# Patient Record
Sex: FEMALE | Race: WHITE | Employment: OTHER | URBAN - METROPOLITAN AREA
[De-identification: names, ages, dates, MRNs, and addresses within clinical notes are randomized per-mention and may not be internally consistent; named-entity substitution may affect disease eponyms.]

---

## 2020-07-22 DIAGNOSIS — F41.1 GAD (GENERALIZED ANXIETY DISORDER): Primary | ICD-10-CM

## 2020-07-24 VITALS
WEIGHT: 118 LBS | OXYGEN SATURATION: 98 % | TEMPERATURE: 97.8 F | HEIGHT: 64 IN | SYSTOLIC BLOOD PRESSURE: 132 MMHG | DIASTOLIC BLOOD PRESSURE: 84 MMHG | BODY MASS INDEX: 20.14 KG/M2 | RESPIRATION RATE: 16 BRPM | HEART RATE: 88 BPM

## 2020-07-24 PROBLEM — E78.5 HYPERLIPIDEMIA: Status: ACTIVE | Noted: 2020-07-24

## 2020-07-24 PROBLEM — I10 ESSENTIAL HYPERTENSION: Status: ACTIVE | Noted: 2020-07-24

## 2020-07-24 PROBLEM — F41.1 GENERALIZED ANXIETY DISORDER: Status: ACTIVE | Noted: 2020-07-24

## 2020-07-24 RX ORDER — METOPROLOL SUCCINATE 25 MG/1
25 TABLET, EXTENDED RELEASE ORAL DAILY
COMMUNITY
Start: 2020-07-08 | End: 2020-10-08

## 2020-07-24 RX ORDER — LISINOPRIL AND HYDROCHLOROTHIAZIDE 12.5; 2 MG/1; MG/1
20 TABLET ORAL DAILY
COMMUNITY
Start: 2020-05-27 | End: 2020-12-11

## 2020-07-24 RX ORDER — LORAZEPAM 0.5 MG/1
TABLET ORAL
Qty: 30 TAB | Refills: 0 | Status: SHIPPED | OUTPATIENT
Start: 2020-07-24 | End: 2020-10-08

## 2020-08-11 ENCOUNTER — OFFICE VISIT (OUTPATIENT)
Dept: FAMILY MEDICINE CLINIC | Age: 85
End: 2020-08-11
Payer: MEDICARE

## 2020-08-11 VITALS
HEIGHT: 64 IN | HEART RATE: 89 BPM | RESPIRATION RATE: 16 BRPM | SYSTOLIC BLOOD PRESSURE: 132 MMHG | TEMPERATURE: 97.5 F | WEIGHT: 118 LBS | OXYGEN SATURATION: 98 % | DIASTOLIC BLOOD PRESSURE: 80 MMHG | BODY MASS INDEX: 20.14 KG/M2

## 2020-08-11 DIAGNOSIS — E78.00 PURE HYPERCHOLESTEROLEMIA: ICD-10-CM

## 2020-08-11 DIAGNOSIS — I10 ESSENTIAL HYPERTENSION: ICD-10-CM

## 2020-08-11 DIAGNOSIS — M54.32 SCIATICA, LEFT SIDE: ICD-10-CM

## 2020-08-11 DIAGNOSIS — F41.1 GENERALIZED ANXIETY DISORDER: ICD-10-CM

## 2020-08-11 DIAGNOSIS — Z00.00 WELLNESS EXAMINATION: Primary | ICD-10-CM

## 2020-08-11 DIAGNOSIS — Z23 ENCOUNTER FOR IMMUNIZATION: ICD-10-CM

## 2020-08-11 PROCEDURE — G8536 NO DOC ELDER MAL SCRN: HCPCS | Performed by: FAMILY MEDICINE

## 2020-08-11 PROCEDURE — G8420 CALC BMI NORM PARAMETERS: HCPCS | Performed by: FAMILY MEDICINE

## 2020-08-11 PROCEDURE — G0439 PPPS, SUBSEQ VISIT: HCPCS | Performed by: FAMILY MEDICINE

## 2020-08-11 PROCEDURE — G8400 PT W/DXA NO RESULTS DOC: HCPCS | Performed by: FAMILY MEDICINE

## 2020-08-11 PROCEDURE — 1101F PT FALLS ASSESS-DOCD LE1/YR: CPT | Performed by: FAMILY MEDICINE

## 2020-08-11 PROCEDURE — G0444 DEPRESSION SCREEN ANNUAL: HCPCS | Performed by: FAMILY MEDICINE

## 2020-08-11 PROCEDURE — 99214 OFFICE O/P EST MOD 30 MIN: CPT | Performed by: FAMILY MEDICINE

## 2020-08-11 PROCEDURE — 1090F PRES/ABSN URINE INCON ASSESS: CPT | Performed by: FAMILY MEDICINE

## 2020-08-11 PROCEDURE — G8427 DOCREV CUR MEDS BY ELIG CLIN: HCPCS | Performed by: FAMILY MEDICINE

## 2020-08-11 PROCEDURE — G8510 SCR DEP NEG, NO PLAN REQD: HCPCS | Performed by: FAMILY MEDICINE

## 2020-08-11 RX ORDER — PREDNISONE 20 MG/1
40 TABLET ORAL DAILY
Qty: 10 TAB | Refills: 0 | Status: SHIPPED | OUTPATIENT
Start: 2020-08-11 | End: 2020-08-16

## 2020-08-11 NOTE — PROGRESS NOTES
Medicare Wellness Exam:    Chief Complaint   Patient presents with    Annual Wellness Visit     SUBSEQUENT     she is a 81y.o. year old female who presents for evaluation for their Medicare Wellness Visit. She is also following up on her history of hypertension, hyperlipidemia, and anxiety. She reports taking her blood pressure medications daily. She does not check home readings because it makes her more anxious and it tends to go up when she does so. She notes that she typically is elevated when she first checks into the office then improves on recheck. She notes that her anxiety is doing overall well even in spite of COVID-19 pandemic. Her only acute complaint today is for pain in her left buttock area that radiates down her leg. It started the day after she had spent 2 full days on the floor doing puzzles with her grandchildren. Fall Screen is completed and assessed=yes. Depression Screen is completed and assessed=yes  Medication list reviewed and adjusted for accuracy=yes  Immunizations reviewed and updated=yes  Health/Preventative Screenings reviewed and updated=yes  ADL Functions reviewed=yes  MiniCog Score= 5/5 (2 points for clock and 3/3 points for recall)   See scanned medicare wellness documents for full details. Patient Active Problem List    Diagnosis    Essential hypertension    Generalized anxiety disorder    Hyperlipidemia       Reviewed PmHx, RxHx, FmHx, SocHx, AllgHx and updated and dated in the chart. Review of Systems   Constitutional: Negative for chills, fever and malaise/fatigue. Respiratory: Negative for cough, shortness of breath and wheezing. Cardiovascular: Negative for chest pain, palpitations and leg swelling. Gastrointestinal: Negative for diarrhea and vomiting. Genitourinary: Negative for dysuria. Musculoskeletal: Negative for falls. Skin: Negative for rash. Neurological: Negative for dizziness, tingling and weakness.    Psychiatric/Behavioral: Negative for depression. The patient is not nervous/anxious. Objective:     Vitals:    08/11/20 0847 08/11/20 0934   BP: 150/80 132/80   Pulse: 89    Resp: 16    Temp: 97.5 °F (36.4 °C)    TempSrc: Temporal    SpO2: 98%    Weight: 118 lb (53.5 kg)    Height: 5' 3.5\" (1.613 m)      Physical Exam  Constitutional:       General: She is not in acute distress. Appearance: Normal appearance. She is normal weight. HENT:      Head: Normocephalic and atraumatic. Neck:      Musculoskeletal: Neck supple. No muscular tenderness. Thyroid: No thyroid mass or thyromegaly. Cardiovascular:      Rate and Rhythm: Normal rate and regular rhythm. Heart sounds: No murmur. Pulmonary:      Effort: Pulmonary effort is normal. No respiratory distress. Breath sounds: Normal breath sounds. No wheezing. Musculoskeletal:      Right lower leg: No edema. Left lower leg: No edema. Lymphadenopathy:      Cervical: No cervical adenopathy. Skin:     General: Skin is warm and dry. Neurological:      Mental Status: She is alert and oriented to person, place, and time. Mental status is at baseline. Psychiatric:         Attention and Perception: Attention and perception normal.         Mood and Affect: Mood and affect normal.         Speech: Speech normal.         Behavior: Behavior normal.          Assessment/ Plan:   Diagnoses and all orders for this visit:    1. Wellness examination  We are getting patient up to date on recommended preventative measures as noted. 2. Encounter for immunization  -     diph,Pertuss,Acell,,Tet Vac-PF (ADACEL) 2 Lf-(2.5-5-3-5 mcg)-5Lf/0.5 mL susp; 0.5 mL by IntraMUSCular route once for 1 dose. 3. Essential hypertension  Blood pressure is at goal on recheck. No medication change.     4. Pure hypercholesterolemia  -     LIPID PANEL  -     METABOLIC PANEL, COMPREHENSIVE  -     CBC WITH AUTOMATED DIFF  Patient has a history of hyperlipidemia for which she is currently not on any medication. We are checking annual levels as listed. 5. Generalized anxiety disorder   reviewed. Patient reports anxiety is well controlled at this time and only needing her lorazepam occasionally. 6. Sciatica, left side  We reviewed some stretching in the office today and will try a prednisone burst to see if that calms down inflammation. Other orders  -     predniSONE (DELTASONE) 20 mg tablet; Take 40 mg by mouth daily for 5 days.         -Pain evaluation performed in office  -Cognitive Screen performed in office  -Depression Screen, Fall risks (by up and go test)  and ADL functionality were addressed  -Medication list updated and reviewed for any changes   -A comprehensive review of medical issues and a plan was formulated  -End of life planning was addressed with pt   -Health Screenings for preventions were addressed and a plan was formulated  -Shingles Vaccine was recommended  -Discussed with patient cancer risk factors and appropriate screenings for age  -Patient evaluated for colonoscopy and referred if needed per screeing criteria  -Labs from previous visits were discussed with patient   -Discussed with patient diet and exercise and formulated a plan as needed  -An Advanced care plan was developed with the patient.  -Alcohol screening performed and was negative    -  Follow-up and Dispositions    · Return in about 6 months (around 2/11/2021) for f/u HTN. non fasting. ok for VV. I have discussed the diagnosis with the patient and the intended plan as seen in the above orders. The patient understands and agrees with the plan. The patient has received an after-visit summary and questions were answered concerning future plans.      Medication Side Effects and Warnings were discussed with patien  Patient Labs were reviewed and or requested  Patient Past Records were reviewed and or requested    Patient Instructions          Piriformis Syndrome: Exercises  Introduction  Here are some examples of exercises for you to try. The exercises may be suggested for a condition or for rehabilitation. Start each exercise slowly. Ease off the exercises if you start to have pain. You will be told when to start these exercises and which ones will work best for you. How to do the exercises  Hip rotator stretch   1. Lie on your back with both knees bent and your feet flat on the floor. 2. Put the ankle of your affected leg on your opposite thigh near your knee. 3. Use your hand to gently push your knee (on your affected leg) away from your body until you feel a gentle stretch around your hip. 4. Hold the stretch for 15 to 30 seconds. 5. Repeat 2 to 4 times. 6. Switch legs and repeat steps 1 through 5. Piriformis stretch   1. Lie on your back with your legs straight. 2. Lift your affected leg and bend your knee. With your opposite hand, reach across your body, and then gently pull your knee toward your opposite shoulder. 3. Hold the stretch for 15 to 30 seconds. 4. Repeat with your other leg. 5. Repeat 2 to 4 times on each side. Lower abdominal strengthening   1. Lie on your back with your knees bent and your feet flat on the floor. 2. Tighten your belly muscles by pulling your belly button in toward your spine. 3. Lift one foot off the floor and bring your knee toward your chest, so that your knee is straight above your hip and your leg is bent like the letter \"L. \"  4. Lift the other knee up to the same position. 5. Lower one leg at a time to the starting position. 6. Keep alternating legs until you have lifted each leg 8 to 12 times. 7. Be sure to keep your belly muscles tight and your back still as you are moving your legs. Be sure to breathe normally. Follow-up care is a key part of your treatment and safety. Be sure to make and go to all appointments, and call your doctor if you are having problems.  It's also a good idea to know your test results and keep a list of the medicines you take.  Current as of: March 2, 2020               Content Version: 12.5  © 8939-5579 "[employer]. Care instructions adapted under license by [employer] (which disclaims liability or warranty for this information). If you have questions about a medical condition or this instruction, always ask your healthcare professional. [address] any warranty or liability for your use of this information. Piriformis Syndrome: Care Instructions  Your Care Instructions     The piriformis muscle is deep under your rear end (buttock). One end of the muscle connects deep inside the pelvic area, and the other end attaches to the top of the thighbone. This muscle can press on the sciatic nerve that runs from your spine down your leg. When this happens, you may have pain, numbness, and tingling in the buttock and down the back of your leg. This is called piriformis syndrome. The pain may get worse when you sit for a long time or climb stairs. Also, you may be more likely to develop piriformis syndrome if you run or walk often. Your doctor will check for other causes of your pain before treating this syndrome. Treatment may include stretching exercises, massage, and medicine for the pain and swelling. If these do not help, you may get a shot of steroid medicine. Until the pain is gone, you may need to rest the muscle and limit activities like running. Exercises and a change in how you move and sit may be enough to stop the pressure on the nerve. Follow-up care is a key part of your treatment and safety. Be sure to make and go to all appointments, and call your doctor if you are having problems. It's also a good idea to know your test results and keep a list of the medicines you take. How can you care for yourself at home? · If your doctor thinks that strenuous exercise is causing your problem, stop or cut back on activities such as running.  You may find swimming to be a good exercise for a while. · Stretch the piriformis muscle. ? Lie on your back. ? Bend one leg at the knee and keep the other leg flat on the ground. ? Raise your bent knee up and then move it across your body. Hold the outside of the knee with the opposite hand. ? Gently pull the knee with your hand toward the opposite shoulder. ? Hold the stretch for at least 15 to 30 seconds. Switch legs. ? Do the stretch several times each day. · Massage the muscle to relieve pressure. ? Sit on the floor. Lean to one side so that the hip on your sore side is off the ground. Put a tennis ball under your buttock on that side. ? As you put weight onto the tennis ball, you may find spots that are especially sore. Move gently so that the tennis ball gently massages each of the sore spots. · Use ice or heat to help reduce pain. Put ice or a cold pack or a heating pad set on low or a warm cloth on the sore area for 10 to 20 minutes at a time. Put a thin cloth between the ice pack or heating pad and your skin. · Ask your doctor if you can take an over-the-counter pain medicine, such as acetaminophen (Tylenol), ibuprofen (Advil, Motrin), or naproxen (Aleve). Be safe with medicines. Read and follow all instructions on the label. · Have your doctor or a physical therapist watch how you move. You may need physical therapy or special inserts in your shoes (orthotics) to help you move in a way that does not put pressure on your nerves. When should you call for help? Watch closely for changes in your health, and be sure to contact your doctor if:  · You do not feel better after several weeks of home care. · Your pain gets worse. · Your leg becomes weak or numb. Where can you learn more? Go to http://michael-enmanuel.info/  Enter C901 in the search box to learn more about \"Piriformis Syndrome: Care Instructions. \"  Current as of: March 2, 2020               Content Version: 12.5  © 0257-8078 [employer]. Care instructions adapted under license by [employer] (which disclaims liability or warranty for this information). If you have questions about a medical condition or this instruction, always ask your healthcare professional. [address] any warranty or liability for your use of this information.             Viola Honeycutt MD

## 2020-08-11 NOTE — PATIENT INSTRUCTIONS
Piriformis Syndrome: Exercises Introduction Here are some examples of exercises for you to try. The exercises may be suggested for a condition or for rehabilitation. Start each exercise slowly. Ease off the exercises if you start to have pain. You will be told when to start these exercises and which ones will work best for you. How to do the exercises Hip rotator stretch 1. Lie on your back with both knees bent and your feet flat on the floor. 2. Put the ankle of your affected leg on your opposite thigh near your knee. 3. Use your hand to gently push your knee (on your affected leg) away from your body until you feel a gentle stretch around your hip. 4. Hold the stretch for 15 to 30 seconds. 5. Repeat 2 to 4 times. 6. Switch legs and repeat steps 1 through 5. Piriformis stretch 1. Lie on your back with your legs straight. 2. Lift your affected leg and bend your knee. With your opposite hand, reach across your body, and then gently pull your knee toward your opposite shoulder. 3. Hold the stretch for 15 to 30 seconds. 4. Repeat with your other leg. 5. Repeat 2 to 4 times on each side. Lower abdominal strengthening 1. Lie on your back with your knees bent and your feet flat on the floor. 2. Tighten your belly muscles by pulling your belly button in toward your spine. 3. Lift one foot off the floor and bring your knee toward your chest, so that your knee is straight above your hip and your leg is bent like the letter \"L. \" 
4. Lift the other knee up to the same position. 5. Lower one leg at a time to the starting position. 6. Keep alternating legs until you have lifted each leg 8 to 12 times. 7. Be sure to keep your belly muscles tight and your back still as you are moving your legs. Be sure to breathe normally. Follow-up care is a key part of your treatment and safety.  Be sure to make and go to all appointments, and call your doctor if you are having problems. It's also a good idea to know your test results and keep a list of the medicines you take. Current as of: March 2, 2020               Content Version: 12.5 © 2006-2020 Healthwise, Incorporated. Care instructions adapted under license by [employer] (which disclaims liability or warranty for this information). If you have questions about a medical condition or this instruction, always ask your healthcare professional. [place] [address] any warranty or liability for your use of this information. Piriformis Syndrome: Care Instructions Your Care Instructions The piriformis muscle is deep under your rear end (buttock). One end of the muscle connects deep inside the pelvic area, and the other end attaches to the top of the thighbone. This muscle can press on the sciatic nerve that runs from your spine down your leg. When this happens, you may have pain, numbness, and tingling in the buttock and down the back of your leg. This is called piriformis syndrome. The pain may get worse when you sit for a long time or climb stairs. Also, you may be more likely to develop piriformis syndrome if you run or walk often. Your doctor will check for other causes of your pain before treating this syndrome. Treatment may include stretching exercises, massage, and medicine for the pain and swelling. If these do not help, you may get a shot of steroid medicine. Until the pain is gone, you may need to rest the muscle and limit activities like running. Exercises and a change in how you move and sit may be enough to stop the pressure on the nerve. Follow-up care is a key part of your treatment and safety. Be sure to make and go to all appointments, and call your doctor if you are having problems. It's also a good idea to know your test results and keep a list of the medicines you take. How can you care for yourself at home? · If your doctor thinks that strenuous exercise is causing your problem, stop or cut back on activities such as running. You may find swimming to be a good exercise for a while. · Stretch the piriformis muscle. ? Lie on your back. ? Bend one leg at the knee and keep the other leg flat on the ground. ? Raise your bent knee up and then move it across your body. Hold the outside of the knee with the opposite hand. ? Gently pull the knee with your hand toward the opposite shoulder. ? Hold the stretch for at least 15 to 30 seconds. Switch legs. ? Do the stretch several times each day. · Massage the muscle to relieve pressure. ? Sit on the floor. Lean to one side so that the hip on your sore side is off the ground. Put a tennis ball under your buttock on that side. ? As you put weight onto the tennis ball, you may find spots that are especially sore. Move gently so that the tennis ball gently massages each of the sore spots. · Use ice or heat to help reduce pain. Put ice or a cold pack or a heating pad set on low or a warm cloth on the sore area for 10 to 20 minutes at a time. Put a thin cloth between the ice pack or heating pad and your skin. · Ask your doctor if you can take an over-the-counter pain medicine, such as acetaminophen (Tylenol), ibuprofen (Advil, Motrin), or naproxen (Aleve). Be safe with medicines. Read and follow all instructions on the label. · Have your doctor or a physical therapist watch how you move. You may need physical therapy or special inserts in your shoes (orthotics) to help you move in a way that does not put pressure on your nerves. When should you call for help? Watch closely for changes in your health, and be sure to contact your doctor if: 
· You do not feel better after several weeks of home care. · Your pain gets worse. · Your leg becomes weak or numb. Where can you learn more? Go to http://www.[employer]/ Enter Y588 in the search box to learn more about \"Piriformis Syndrome: Care Instructions. \" Current as of: March 2, 2020               Content Version: 12.5 © 6707-5176 Healthwise, Incorporated. Care instructions adapted under license by [employer] (which disclaims liability or warranty for this information). If you have questions about a medical condition or this instruction, always ask your healthcare professional. [place] [address] any warranty or liability for your use of this information.

## 2020-08-12 LAB
ALBUMIN SERPL-MCNC: 4.7 G/DL (ref 3.6–4.6)
ALBUMIN/GLOB SERPL: 2.4 {RATIO} (ref 1.2–2.2)
ALP SERPL-CCNC: 48 IU/L (ref 39–117)
ALT SERPL-CCNC: 16 IU/L (ref 0–32)
AST SERPL-CCNC: 24 IU/L (ref 0–40)
BASOPHILS # BLD AUTO: 0.1 X10E3/UL (ref 0–0.2)
BASOPHILS NFR BLD AUTO: 1 %
BILIRUB SERPL-MCNC: 0.4 MG/DL (ref 0–1.2)
BUN SERPL-MCNC: 35 MG/DL (ref 8–27)
BUN/CREAT SERPL: 38 (ref 12–28)
CALCIUM SERPL-MCNC: 9.5 MG/DL (ref 8.7–10.3)
CHLORIDE SERPL-SCNC: 103 MMOL/L (ref 96–106)
CHOLEST SERPL-MCNC: 212 MG/DL (ref 100–199)
CO2 SERPL-SCNC: 25 MMOL/L (ref 20–29)
CREAT SERPL-MCNC: 0.91 MG/DL (ref 0.57–1)
EOSINOPHIL # BLD AUTO: 0.1 X10E3/UL (ref 0–0.4)
EOSINOPHIL NFR BLD AUTO: 1 %
ERYTHROCYTE [DISTWIDTH] IN BLOOD BY AUTOMATED COUNT: 11.8 % (ref 11.7–15.4)
GLOBULIN SER CALC-MCNC: 2 G/DL (ref 1.5–4.5)
GLUCOSE SERPL-MCNC: 95 MG/DL (ref 65–99)
HCT VFR BLD AUTO: 38.7 % (ref 34–46.6)
HDLC SERPL-MCNC: 82 MG/DL
HGB BLD-MCNC: 12.8 G/DL (ref 11.1–15.9)
IMM GRANULOCYTES # BLD AUTO: 0 X10E3/UL (ref 0–0.1)
IMM GRANULOCYTES NFR BLD AUTO: 0 %
LDLC SERPL CALC-MCNC: 118 MG/DL (ref 0–99)
LYMPHOCYTES # BLD AUTO: 1.9 X10E3/UL (ref 0.7–3.1)
LYMPHOCYTES NFR BLD AUTO: 24 %
MCH RBC QN AUTO: 30.6 PG (ref 26.6–33)
MCHC RBC AUTO-ENTMCNC: 33.1 G/DL (ref 31.5–35.7)
MCV RBC AUTO: 93 FL (ref 79–97)
MONOCYTES # BLD AUTO: 0.5 X10E3/UL (ref 0.1–0.9)
MONOCYTES NFR BLD AUTO: 7 %
NEUTROPHILS # BLD AUTO: 5.1 X10E3/UL (ref 1.4–7)
NEUTROPHILS NFR BLD AUTO: 67 %
PLATELET # BLD AUTO: 206 X10E3/UL (ref 150–450)
POTASSIUM SERPL-SCNC: 4.7 MMOL/L (ref 3.5–5.2)
PROT SERPL-MCNC: 6.7 G/DL (ref 6–8.5)
RBC # BLD AUTO: 4.18 X10E6/UL (ref 3.77–5.28)
SODIUM SERPL-SCNC: 141 MMOL/L (ref 134–144)
TRIGL SERPL-MCNC: 62 MG/DL (ref 0–149)
VLDLC SERPL CALC-MCNC: 12 MG/DL (ref 5–40)
WBC # BLD AUTO: 7.6 X10E3/UL (ref 3.4–10.8)

## 2020-09-18 ENCOUNTER — TELEPHONE (OUTPATIENT)
Dept: FAMILY MEDICINE CLINIC | Age: 85
End: 2020-09-18

## 2020-09-18 DIAGNOSIS — M54.32 SCIATICA, LEFT SIDE: Primary | ICD-10-CM

## 2020-09-18 NOTE — TELEPHONE ENCOUNTER
Patient would like a refill on Prednisone and she did not get a prescription for the muscle relaxer that you discussed.

## 2020-09-18 NOTE — TELEPHONE ENCOUNTER
During Aug apt you had discussed prednisone with a muscle relaxer. patient has finished prednisone but muscle relaxer was never prescribed. Sciatic nerve sx are still present, is it possible to get more prednisone and to get the muscle relaxer sent in to take together?     Please advise

## 2020-09-21 RX ORDER — BACLOFEN 10 MG/1
TABLET ORAL
Qty: 100 TAB | Refills: 0 | Status: SHIPPED | OUTPATIENT
Start: 2020-09-21 | End: 2021-02-16 | Stop reason: ALTCHOICE

## 2020-09-21 RX ORDER — PREDNISONE 20 MG/1
40 TABLET ORAL DAILY
Qty: 10 TAB | Refills: 0 | Status: SHIPPED | OUTPATIENT
Start: 2020-09-21 | End: 2020-09-26

## 2020-11-09 ENCOUNTER — TELEPHONE (OUTPATIENT)
Dept: FAMILY MEDICINE CLINIC | Age: 85
End: 2020-11-09

## 2020-11-09 DIAGNOSIS — M54.32 SCIATICA, LEFT SIDE: Primary | ICD-10-CM

## 2020-11-09 NOTE — TELEPHONE ENCOUNTER
Spoke to patient, informed. She is going to wait for [facility] imaging to open.

## 2020-11-09 NOTE — TELEPHONE ENCOUNTER
Patient would like to consider getting the back xray and any imaging place close to [place] will be fine.

## 2020-11-19 ENCOUNTER — HOSPITAL ENCOUNTER (OUTPATIENT)
Dept: GENERAL RADIOLOGY | Age: 85
Discharge: HOME | End: 2020-11-19
Payer: MEDICARE

## 2020-11-19 DIAGNOSIS — M54.32 SCIATICA, LEFT SIDE: ICD-10-CM

## 2020-11-19 PROCEDURE — 72100 X-RAY EXAM L-S SPINE 2/3 VWS: CPT

## 2021-01-13 DIAGNOSIS — F41.1 GAD (GENERALIZED ANXIETY DISORDER): ICD-10-CM

## 2021-01-14 RX ORDER — LORAZEPAM 0.5 MG/1
TABLET ORAL
Qty: 30 TAB | Refills: 0 | Status: SHIPPED | OUTPATIENT
Start: 2021-01-14 | End: 2021-03-27

## 2021-01-22 ENCOUNTER — TELEPHONE (OUTPATIENT)
Dept: FAMILY MEDICINE CLINIC | Age: 86
End: 2021-01-22

## 2021-02-16 ENCOUNTER — OFFICE VISIT (OUTPATIENT)
Dept: FAMILY MEDICINE CLINIC | Age: 86
End: 2021-02-16
Payer: MEDICARE

## 2021-02-16 VITALS
OXYGEN SATURATION: 98 % | HEIGHT: 64 IN | RESPIRATION RATE: 14 BRPM | TEMPERATURE: 95.9 F | DIASTOLIC BLOOD PRESSURE: 80 MMHG | SYSTOLIC BLOOD PRESSURE: 128 MMHG | HEART RATE: 68 BPM | WEIGHT: 119.8 LBS | BODY MASS INDEX: 20.45 KG/M2

## 2021-02-16 DIAGNOSIS — E78.00 PURE HYPERCHOLESTEROLEMIA: ICD-10-CM

## 2021-02-16 DIAGNOSIS — M54.32 SCIATICA, LEFT SIDE: ICD-10-CM

## 2021-02-16 DIAGNOSIS — I10 ESSENTIAL HYPERTENSION: Primary | ICD-10-CM

## 2021-02-16 DIAGNOSIS — F41.9 ANXIETY: ICD-10-CM

## 2021-02-16 PROCEDURE — G8420 CALC BMI NORM PARAMETERS: HCPCS | Performed by: FAMILY MEDICINE

## 2021-02-16 PROCEDURE — G8510 SCR DEP NEG, NO PLAN REQD: HCPCS | Performed by: FAMILY MEDICINE

## 2021-02-16 PROCEDURE — 1101F PT FALLS ASSESS-DOCD LE1/YR: CPT | Performed by: FAMILY MEDICINE

## 2021-02-16 PROCEDURE — G8754 DIAS BP LESS 90: HCPCS | Performed by: FAMILY MEDICINE

## 2021-02-16 PROCEDURE — 99214 OFFICE O/P EST MOD 30 MIN: CPT | Performed by: FAMILY MEDICINE

## 2021-02-16 PROCEDURE — G8400 PT W/DXA NO RESULTS DOC: HCPCS | Performed by: FAMILY MEDICINE

## 2021-02-16 PROCEDURE — G8427 DOCREV CUR MEDS BY ELIG CLIN: HCPCS | Performed by: FAMILY MEDICINE

## 2021-02-16 PROCEDURE — G8752 SYS BP LESS 140: HCPCS | Performed by: FAMILY MEDICINE

## 2021-02-16 PROCEDURE — G8536 NO DOC ELDER MAL SCRN: HCPCS | Performed by: FAMILY MEDICINE

## 2021-02-16 PROCEDURE — 1090F PRES/ABSN URINE INCON ASSESS: CPT | Performed by: FAMILY MEDICINE

## 2021-02-16 RX ORDER — CYCLOBENZAPRINE HCL 5 MG
5 TABLET ORAL
Qty: 60 TAB | Refills: 0 | Status: SHIPPED | OUTPATIENT
Start: 2021-02-16 | End: 2022-09-13 | Stop reason: SDUPTHER

## 2021-02-16 NOTE — PROGRESS NOTES
Subjective  Chief Complaint   Patient presents with    Hypertension    Labs     HPI:  Jolly Aguilar is a 81 y.o. female. She is following up on histories of hypertension, hyperlipidemia, and anxiety. She reports taking her blood pressure medication daily as directed but is not checking home blood pressure readings. She is currently diet and exercise controlled for the mild elevation in cholesterol and would like to remain so. For the anxiety, she does well for a few weeks without any issues then may have a bad week where she needs to take the lorazepam for a few days. Overall symptoms have remained stable. She is requesting Flexeril for the intermittent low back pain with sciatica that she gets on the left side. She tried the baclofen but it really did not help. Years ago when she took Flexeril she did much better with it. Past Medical History:   Diagnosis Date    Essential hypertension 7/24/2020    Generalized anxiety disorder 7/24/2020    History of appendectomy     History of tonsillectomy     Hyperlipidemia 7/24/2020     Family History   Problem Relation Age of Onset    Hypertension Mother     Hypertension Father     Cancer Other      Social History     Socioeconomic History    Marital status:      Spouse name: Not on file    Number of children: Not on file    Years of education: Not on file    Highest education level: Not on file   Occupational History    Not on file   Social Needs    Financial resource strain: Not on file    Food insecurity     Worry: Not on file     Inability: Not on file    Transportation needs     Medical: Not on file     Non-medical: Not on file   Tobacco Use    Smoking status: Never Smoker    Smokeless tobacco: Never Used   Substance and Sexual Activity    Alcohol use:  Yes    Drug use: Not on file    Sexual activity: Not on file   Lifestyle    Physical activity     Days per week: Not on file     Minutes per session: Not on file    Stress: Not on file   Relationships    Social connections     Talks on phone: Not on file     Gets together: Not on file     Attends Nondenominational service: Not on file     Active member of club or organization: Not on file     Attends meetings of clubs or organizations: Not on file     Relationship status: Not on file    Intimate partner violence     Fear of current or ex partner: Not on file     Emotionally abused: Not on file     Physically abused: Not on file     Forced sexual activity: Not on file   Other Topics Concern    Not on file   Social History Narrative    Not on file     Current Outpatient Medications on File Prior to Visit   Medication Sig Dispense Refill    LORazepam (ATIVAN) 0.5 mg tablet TAKE 1/2 TO 1 TABLET BY MOUTH THREE TIMES A DAY AS NEEDED 30 Tab 0    lisinopril-hydroCHLOROthiazide (PRINZIDE, ZESTORETIC) 20-12.5 mg per tablet TAKE ONE TABLET BY MOUTH DAILY - APPOINTMENT NEEDED FOR ADDITIONAL REFILLS 90 Tab 2    metoprolol succinate (TOPROL-XL) 25 mg XL tablet TAKE 1 TABLET BY MOUTH EVERY DAY 90 Tab 1    [DISCONTINUED] baclofen (LIORESAL) 10 mg tablet Take 1-2 tabs 4 times daily as needed for muscle pain 100 Tab 0     No current facility-administered medications on file prior to visit. No Known Allergies  Review of Systems   Constitutional: Negative for chills, fever and malaise/fatigue. Respiratory: Negative for cough, shortness of breath and wheezing. Cardiovascular: Negative for chest pain, palpitations and leg swelling. Neurological: Negative for weakness. Psychiatric/Behavioral: Negative for depression. The patient is nervous/anxious. Objective  Visit Vitals  /80 (BP 1 Location: Left upper arm, BP Patient Position: Sitting, BP Cuff Size: Adult)   Pulse 68   Temp (!) 95.9 °F (35.5 °C) (Temporal)   Resp 14   Ht 5' 3.5\" (1.613 m)   Wt 119 lb 12.8 oz (54.3 kg)   SpO2 98%   BMI 20.89 kg/m²     Physical Exam  Constitutional:       General: She is not in acute distress. Appearance: Normal appearance. She is normal weight.   HENT:      Head: Normocephalic and atraumatic.   Neck:      Musculoskeletal: Neck supple. No muscular tenderness.      Thyroid: No thyroid mass or thyromegaly.   Cardiovascular:      Rate and Rhythm: Normal rate and regular rhythm.      Heart sounds: No murmur.   Pulmonary:      Effort: Pulmonary effort is normal. No respiratory distress.      Breath sounds: Normal breath sounds. No wheezing.   Musculoskeletal:      Right lower leg: No edema.      Left lower leg: No edema.   Lymphadenopathy:      Cervical: No cervical adenopathy.   Skin:     General: Skin is warm and dry.   Neurological:      Mental Status: She is alert and oriented to person, place, and time. Mental status is at baseline.   Psychiatric:         Attention and Perception: Attention and perception normal.         Mood and Affect: Mood and affect normal.         Speech: Speech normal.         Behavior: Behavior normal.          Assessment & Plan    ICD-10-CM ICD-9-CM    1. Essential hypertension  I10 401.9    2. Pure hypercholesterolemia  E78.00 272.0    3. Anxiety  F41.9 300.00    4. Sciatica, left side  M54.32 724.3 cyclobenzaprine (FLEXERIL) 5 mg tablet     Diagnoses and all orders for this visit:    1. Essential hypertension  Blood pressure is excellent on intake.  We will continue her current dosing on lisinopril hydrochlorothiazide and metoprolol.  I do encourage her to check weekly home readings.    2. Pure hypercholesterolemia  We reviewed that her LDL is mildly elevated but she would like to continue managing with diet and exercise.  Handout provided.    3. Anxiety   reviewed.  Fill dates are consistent with patient's reporting that she would use the lorazepam for a few days then not at all for a few weeks.  We will continue as needed use.    4. Sciatica, left side  -     cyclobenzaprine (FLEXERIL) 5 mg tablet; Take 1 Tab by mouth three (3) times daily as needed for Muscle Spasm(s).  I  am agreeable to try the Flexeril but warned patient that it can cause significant drowsiness especially given her age. She will be cautious and reports that she will likely only use it at bedtime as needed. Follow-up and Dispositions    · Return in about 6 months (around 8/16/2021) for [address], fasting follow up.        Clemente Castro MD

## 2021-02-16 NOTE — PATIENT INSTRUCTIONS
Hyperlipidemia: After Your Visit  Your Care Instructions  Hyperlipidemia is too much fat in your blood. The body has several kinds of fat, including cholesterol and triglycerides. Your body needs fat for many things, such as making new cells. But too much fat in your blood increases your chances of having a heart attack or stroke. You may be able to lower your cholesterol and triglycerides with a heart-healthy diet, exercise, and if needed, medicine. Your doctor may want you to try lifestyle changes first to see whether they lower the fat in your blood. You may need to take medicine if lifestyle changes do not lower the fat in your blood enough. Follow-up care is a key part of your treatment and safety. Be sure to make and go to all appointments, and call your doctor if you are having problems. Its also a good idea to know your test results and keep a list of the medicines you take. How can you care for yourself at home? Take your medicines  · Take your medicines exactly as prescribed. Call your doctor if you think you are having a problem with your medicine. · If you take medicine to lower your cholesterol, go to follow-up visits. You will need to have blood tests. · Do not take large doses of niacin, which is a B vitamin, while taking medicine called statins. It may increase the chance of muscle pain and liver problems. · Talk to your doctor about avoiding grapefruit juice if you are taking statins. Grapefruit juice can raise the level of this medicine in your blood. This could increase side effects. Eat more fruits, vegetables, and fiber  · Fruits and vegetables have lots of nutrients that help protect against heart disease, and they have little--if any--fat. Try to eat at least five servings a day. Dark green, deep orange, or yellow fruits and vegetables are healthy choices. · Keep carrots, celery, and other veggies handy for snacks.  Buy fruit that is in season and store it where you can see it so that you will be tempted to eat it. Cook dishes that have a lot of veggies in them, such as stir-fries and soups. · Foods high in fiber may reduce your cholesterol and provide important vitamins and minerals. High-fiber foods include whole-grain cereals and breads, oatmeal, beans, brown rice, citrus fruits, and apples. · Buy whole-grain breads and cereals instead of white bread and pastries. Limit saturated fat  · Read food labels and try to avoid saturated fat and trans fat. They increase your risk of heart disease. · Use olive or canola oil when you cook. Try cholesterol-lowering spreads, such as Benecol or Take Control. · Bake, broil, grill, or steam foods instead of frying them. · Limit the amount of high-fat meats you eat, including hot dogs and sausages. Cut out all visible fat when you prepare meat. · Eat fish, skinless poultry, and soy products such as tofu instead of high-fat meats. Soybeans may be especially good for your heart. Eat at least two servings of fish a week. Certain fish, such as salmon, contain omega-3 fatty acids, which may help reduce your risk of heart attack. · Choose low-fat or fat-free milk and dairy products. Get exercise, limit alcohol, and quit smoking  · Get more exercise. Work with your doctor to set up an exercise program. Even if you can do only a small amount, exercise will help you get stronger, have more energy, and manage your weight and your stress. Walking is an easy way to get exercise. Gradually increase the amount you walk every day. Aim for at least 30 minutes on most days of the week. You also may want to swim, bike, or do other activities. · Limit alcohol to no more than 2 drinks a day for men and 1 drink a day for women. · Do not smoke. If you need help quitting, talk to your doctor about stop-smoking programs and medicines. These can increase your chances of quitting for good. When should you call for help?   Call 911 anytime you think you may need emergency care. For example, call if:  · You have symptoms of a heart attack. These may include:  ¨ Chest pain or pressure, or a strange feeling in the chest.  ¨ Sweating. ¨ Shortness of breath. ¨ Nausea or vomiting. ¨ Pain, pressure, or a strange feeling in the back, neck, jaw, or upper belly or in one or both shoulders or arms. ¨ Lightheadedness or sudden weakness. ¨ A fast or irregular heartbeat. After you call 911, the  may tell you to chew 1 adult-strength or 2 to 4 low-dose aspirin. Wait for an ambulance. Do not try to drive yourself. · You have signs of a stroke. These may include:  ¨ Sudden numbness, paralysis, or weakness in your face, arm, or leg, especially on only one side of your body. ¨ New problems with walking or balance. ¨ Sudden vision changes. ¨ Drooling or slurred speech. ¨ New problems speaking or understanding simple statements, or feeling confused. ¨ A sudden, severe headache that is different from past headaches. · You passed out (lost consciousness). Call your doctor now or seek immediate medical care if:  · You have muscle pain or weakness. Watch closely for changes in your health, and be sure to contact your doctor if:  · You are very tired. · You have an upset stomach, gas, constipation, or belly pain or cramps. Where can you learn more? Go to [employer]  Enter C406 in the search box to learn more about \"Hyperlipidemia: After Your Visit. \"   © 2687-3650 [employer]. Care instructions adapted under license by [employer] (which disclaims liability or warranty for this information). This care instruction is for use with your licensed healthcare professional. If you have questions about a medical condition or this instruction, always ask your healthcare professional. [place] [address] any warranty or liability for your use of this information.   Content Version: 4.8.151157; Last Revised: October 13, 2011

## 2021-03-01 ENCOUNTER — TELEPHONE (OUTPATIENT)
Dept: FAMILY MEDICINE CLINIC | Age: 86
End: 2021-03-01

## 2021-03-01 DIAGNOSIS — M54.32 SCIATICA, LEFT SIDE: Primary | ICD-10-CM

## 2021-03-01 NOTE — TELEPHONE ENCOUNTER
Shes decided she would like a referral to [address] [place] [address] for her back and leg the one here in [place].  Please notify patient

## 2021-03-08 ENCOUNTER — TELEPHONE (OUTPATIENT)
Dept: FAMILY MEDICINE CLINIC | Age: 86
End: 2021-03-08

## 2021-03-26 DIAGNOSIS — F41.1 GAD (GENERALIZED ANXIETY DISORDER): ICD-10-CM

## 2021-03-27 RX ORDER — LORAZEPAM 0.5 MG/1
TABLET ORAL
Qty: 30 TAB | Refills: 0 | Status: SHIPPED | OUTPATIENT
Start: 2021-03-27 | End: 2021-06-17

## 2021-06-16 DIAGNOSIS — F41.1 GAD (GENERALIZED ANXIETY DISORDER): ICD-10-CM

## 2021-06-17 RX ORDER — LORAZEPAM 0.5 MG/1
TABLET ORAL
Qty: 30 TABLET | Refills: 0 | Status: SHIPPED | OUTPATIENT
Start: 2021-06-17 | End: 2021-10-01

## 2021-08-24 ENCOUNTER — OFFICE VISIT (OUTPATIENT)
Dept: FAMILY MEDICINE CLINIC | Age: 86
End: 2021-08-24
Payer: MEDICARE

## 2021-08-24 VITALS
SYSTOLIC BLOOD PRESSURE: 118 MMHG | HEART RATE: 78 BPM | HEIGHT: 64 IN | TEMPERATURE: 97.1 F | BODY MASS INDEX: 19.81 KG/M2 | DIASTOLIC BLOOD PRESSURE: 70 MMHG | WEIGHT: 116 LBS

## 2021-08-24 DIAGNOSIS — F41.1 GENERALIZED ANXIETY DISORDER: ICD-10-CM

## 2021-08-24 DIAGNOSIS — Z13.39 ALCOHOL SCREENING: ICD-10-CM

## 2021-08-24 DIAGNOSIS — E78.00 PURE HYPERCHOLESTEROLEMIA: ICD-10-CM

## 2021-08-24 DIAGNOSIS — Z23 ENCOUNTER FOR IMMUNIZATION: ICD-10-CM

## 2021-08-24 DIAGNOSIS — Z00.00 WELLNESS EXAMINATION: Primary | ICD-10-CM

## 2021-08-24 DIAGNOSIS — Z78.0 POSTMENOPAUSAL STATE: ICD-10-CM

## 2021-08-24 DIAGNOSIS — Z13.31 DEPRESSION SCREENING: ICD-10-CM

## 2021-08-24 DIAGNOSIS — I10 ESSENTIAL HYPERTENSION: ICD-10-CM

## 2021-08-24 PROCEDURE — 99214 OFFICE O/P EST MOD 30 MIN: CPT | Performed by: FAMILY MEDICINE

## 2021-08-24 PROCEDURE — G8510 SCR DEP NEG, NO PLAN REQD: HCPCS | Performed by: FAMILY MEDICINE

## 2021-08-24 PROCEDURE — G0439 PPPS, SUBSEQ VISIT: HCPCS | Performed by: FAMILY MEDICINE

## 2021-08-24 PROCEDURE — 1101F PT FALLS ASSESS-DOCD LE1/YR: CPT | Performed by: FAMILY MEDICINE

## 2021-08-24 PROCEDURE — G8754 DIAS BP LESS 90: HCPCS | Performed by: FAMILY MEDICINE

## 2021-08-24 PROCEDURE — G8536 NO DOC ELDER MAL SCRN: HCPCS | Performed by: FAMILY MEDICINE

## 2021-08-24 PROCEDURE — 1090F PRES/ABSN URINE INCON ASSESS: CPT | Performed by: FAMILY MEDICINE

## 2021-08-24 PROCEDURE — G8427 DOCREV CUR MEDS BY ELIG CLIN: HCPCS | Performed by: FAMILY MEDICINE

## 2021-08-24 PROCEDURE — G8400 PT W/DXA NO RESULTS DOC: HCPCS | Performed by: FAMILY MEDICINE

## 2021-08-24 PROCEDURE — G8420 CALC BMI NORM PARAMETERS: HCPCS | Performed by: FAMILY MEDICINE

## 2021-08-24 PROCEDURE — G8752 SYS BP LESS 140: HCPCS | Performed by: FAMILY MEDICINE

## 2021-08-24 RX ORDER — TETANUS TOXOID, REDUCED DIPHTHERIA TOXOID AND ACELLULAR PERTUSSIS VACCINE, ADSORBED 5; 2.5; 8; 8; 2.5 [IU]/.5ML; [IU]/.5ML; UG/.5ML; UG/.5ML; UG/.5ML
0.5 SUSPENSION INTRAMUSCULAR ONCE
Qty: 0.5 ML | Refills: 0 | Status: SHIPPED | OUTPATIENT
Start: 2021-08-24 | End: 2021-08-24

## 2021-08-24 NOTE — PROGRESS NOTES
Chief Complaint   Patient presents with   [address] [place] [address] Annual Wellness Visit   1. Have you been to the ER, urgent care clinic since your last visit? Hospitalized since your last visit? No    2. Have you seen or consulted any other health care providers outside of the [address] [place] [address] since your last visit? Include any pap smears or colon screening. No     3 most recent PHQ Screens 8/24/2021   Little interest or pleasure in doing things Not at all   Feeling down, depressed, irritable, or hopeless Not at all   Total Score PHQ 2 0     Fall Risk Assessment, last 12 mths 8/24/2021   Able to walk? Yes   Fall in past 12 months? 1   Do you feel unsteady? 0   Are you worried about falling 0   Is TUG test greater than 12 seconds? 0   Is the gait abnormal? 0   Number of falls in past 12 months 1   Fall with injury?  1

## 2021-08-24 NOTE — PROGRESS NOTES
Medicare Wellness Exam:    Chief Complaint   Patient presents with    Annual Wellness Visit     she is a 81y.o. year old female who presents for evaluation for their Medicare Wellness Visit. She is also following up on history of hypertension, hyperlipidemia, and anxiety. She does report taking her medications as directed. She has not eaten anything today but did have some cream and artificial sugar in her coffee this morning. She would like to have labs drawn regardless. For the anxiety she notes continuing to use the lorazepam sparingly. She may have a week where she needs it 3 times but then can go weeks without needing it at all. She certainly reports fewer days taking the medicine that not. Fall Screen is completed and assessed=yes. Depression Screen is completed and assessed=yes  Medication list reviewed and adjusted for accuracy=yes  Immunizations reviewed and updated=yes  Health/Preventative Screenings reviewed and updated=yes  ADL Functions reviewed=yes  MiniCog Score= 5/5 (2 points for clock and 3/3 points for recall)   See EMR questionnaires and scanned medicare wellness documents for full details. Patient Active Problem List    Diagnosis    Essential hypertension    Generalized anxiety disorder    Pure hypercholesterolemia       Reviewed PmHx, RxHx, FmHx, SocHx, AllgHx and updated and dated in the chart. Review of Systems   Constitutional: Negative for chills, fever and malaise/fatigue. Respiratory: Negative for cough, shortness of breath and wheezing. Cardiovascular: Negative for chest pain, palpitations and leg swelling. Gastrointestinal: Negative for diarrhea and vomiting. Genitourinary: Negative for dysuria. Neurological: Negative for dizziness, tingling and weakness. Psychiatric/Behavioral: Negative for depression. The patient is not nervous/anxious.          Objective:     Vitals:    08/24/21 0846   BP: 118/70   Pulse: 78   Temp: 97.1 °F (36.2 °C)   TempSrc: Temporal   Weight: 116 lb (52.6 kg)   Height: 5' 3.5\" (1.613 m)     Physical Exam  Constitutional:       General: She is not in acute distress. Appearance: Normal appearance. She is normal weight. HENT:      Head: Normocephalic and atraumatic. Neck:      Thyroid: No thyroid mass or thyromegaly. Cardiovascular:      Rate and Rhythm: Normal rate and regular rhythm. Heart sounds: No murmur heard. Pulmonary:      Effort: Pulmonary effort is normal. No respiratory distress. Breath sounds: Normal breath sounds. No wheezing. Musculoskeletal:      Cervical back: Neck supple. No muscular tenderness. Right lower leg: No edema. Left lower leg: No edema. Lymphadenopathy:      Cervical: No cervical adenopathy. Skin:     General: Skin is warm and dry. Neurological:      Mental Status: She is alert and oriented to person, place, and time. Mental status is at baseline. Psychiatric:         Attention and Perception: Attention and perception normal.         Mood and Affect: Mood and affect normal.         Speech: Speech normal.         Behavior: Behavior normal.          Assessment/ Plan:   Diagnoses and all orders for this visit:    1. Wellness examination  We are getting patient up to date on recommended preventative measures as noted. 2. Encounter for immunization  -     diphth,pertus,acell,,tetanus (Boostrix Tdap) 2.5-8-5 Lf-mcg-Lf/0.5mL susp suspension; 0.5 mL by IntraMUSCular route once for 1 dose. Patient instructed to go to pharmacy for Tdap. 3. Alcohol screening    4. Depression screening    5. Postmenopausal state  Patient declines bone density testing. We reviewed risks of spontaneous fracture if she has undiagnosed osteoporosis. Patient reports understanding. 6. Essential hypertension  Home readings are at goal.  We will continue current doses of metoprolol and lisinopril hydrochlorothiazide.     7. Pure hypercholesterolemia  -     LIPID PANEL  -     METABOLIC PANEL, COMPREHENSIVE  -     CBC WITH AUTOMATED DIFF  She is currently not on medication for this history but we will check levels as listed and make adjustments if necessary. 8. Generalized anxiety disorder   reviewed. Patient is using lorazepam as needed.       -Pain evaluation performed today  -Cognitive Screen performed today  -Depression Screen, Fall risks (by up and go test)  and ADL functionality were addressed  -Medication list updated and reviewed for any changes   -A comprehensive review of medical issues and a plan was formulated  -End of life planning was addressed with pt   -Health Screenings for preventions were addressed and a plan was formulated  -Discussed with patient cancer risk factors and appropriate screenings for age  -Labs from previous visits were discussed with patient   -Discussed with patient diet and exercise and formulated a plan as needed  -An Advanced care plan was developed with the patient.  -Alcohol screening performed    -  Follow-up and Dispositions    · Return in about 6 months (around 2/24/2022) for f/u HTN, non fasting. I have discussed the diagnosis with the patient and the intended plan as seen in the above orders. The patient understands and agrees with the plan. The patient has received an after-visit summary and questions were answered concerning future plans. Medication Side Effects and Warnings were discussed with patien  Patient Labs were reviewed and or requested  Patient Past Records were reviewed and or requested    There are no Patient Instructions on file for this visit.       Lisa Franco MD

## 2021-08-25 LAB
ALBUMIN SERPL-MCNC: 4.3 G/DL (ref 3.6–4.6)
ALBUMIN/GLOB SERPL: 1.9 {RATIO} (ref 1.2–2.2)
ALP SERPL-CCNC: 56 IU/L (ref 48–121)
ALT SERPL-CCNC: 16 IU/L (ref 0–32)
AST SERPL-CCNC: 20 IU/L (ref 0–40)
BASOPHILS # BLD AUTO: 0.1 X10E3/UL (ref 0–0.2)
BASOPHILS NFR BLD AUTO: 1 %
BILIRUB SERPL-MCNC: 0.5 MG/DL (ref 0–1.2)
BUN SERPL-MCNC: 32 MG/DL (ref 8–27)
BUN/CREAT SERPL: 36 (ref 12–28)
CALCIUM SERPL-MCNC: 9.6 MG/DL (ref 8.7–10.3)
CHLORIDE SERPL-SCNC: 104 MMOL/L (ref 96–106)
CHOLEST SERPL-MCNC: 214 MG/DL (ref 100–199)
CO2 SERPL-SCNC: 25 MMOL/L (ref 20–29)
CREAT SERPL-MCNC: 0.9 MG/DL (ref 0.57–1)
EOSINOPHIL # BLD AUTO: 0.1 X10E3/UL (ref 0–0.4)
EOSINOPHIL NFR BLD AUTO: 2 %
ERYTHROCYTE [DISTWIDTH] IN BLOOD BY AUTOMATED COUNT: 12.1 % (ref 11.7–15.4)
GLOBULIN SER CALC-MCNC: 2.3 G/DL (ref 1.5–4.5)
GLUCOSE SERPL-MCNC: 102 MG/DL (ref 65–99)
HCT VFR BLD AUTO: 38.7 % (ref 34–46.6)
HDLC SERPL-MCNC: 78 MG/DL
HGB BLD-MCNC: 12.4 G/DL (ref 11.1–15.9)
IMM GRANULOCYTES # BLD AUTO: 0 X10E3/UL (ref 0–0.1)
IMM GRANULOCYTES NFR BLD AUTO: 0 %
LDLC SERPL CALC-MCNC: 125 MG/DL (ref 0–99)
LYMPHOCYTES # BLD AUTO: 1.5 X10E3/UL (ref 0.7–3.1)
LYMPHOCYTES NFR BLD AUTO: 25 %
MCH RBC QN AUTO: 30.8 PG (ref 26.6–33)
MCHC RBC AUTO-ENTMCNC: 32 G/DL (ref 31.5–35.7)
MCV RBC AUTO: 96 FL (ref 79–97)
MONOCYTES # BLD AUTO: 0.5 X10E3/UL (ref 0.1–0.9)
MONOCYTES NFR BLD AUTO: 9 %
NEUTROPHILS # BLD AUTO: 3.8 X10E3/UL (ref 1.4–7)
NEUTROPHILS NFR BLD AUTO: 63 %
PLATELET # BLD AUTO: 207 X10E3/UL (ref 150–450)
POTASSIUM SERPL-SCNC: 4.5 MMOL/L (ref 3.5–5.2)
PROT SERPL-MCNC: 6.6 G/DL (ref 6–8.5)
RBC # BLD AUTO: 4.02 X10E6/UL (ref 3.77–5.28)
SODIUM SERPL-SCNC: 143 MMOL/L (ref 134–144)
TRIGL SERPL-MCNC: 62 MG/DL (ref 0–149)
VLDLC SERPL CALC-MCNC: 11 MG/DL (ref 5–40)
WBC # BLD AUTO: 6 X10E3/UL (ref 3.4–10.8)

## 2021-09-30 DIAGNOSIS — F41.1 GAD (GENERALIZED ANXIETY DISORDER): ICD-10-CM

## 2021-10-01 RX ORDER — LORAZEPAM 0.5 MG/1
TABLET ORAL
Qty: 30 TABLET | Refills: 0 | Status: SHIPPED | OUTPATIENT
Start: 2021-10-01 | End: 2022-02-28 | Stop reason: SDUPTHER

## 2021-11-08 RX ORDER — LISINOPRIL AND HYDROCHLOROTHIAZIDE 12.5; 2 MG/1; MG/1
TABLET ORAL
Qty: 90 TABLET | Refills: 2 | Status: SHIPPED | OUTPATIENT
Start: 2021-11-08 | End: 2022-02-28 | Stop reason: SDUPTHER

## 2022-01-04 RX ORDER — METOPROLOL SUCCINATE 25 MG/1
TABLET, EXTENDED RELEASE ORAL
Qty: 90 TABLET | Refills: 1 | Status: SHIPPED | OUTPATIENT
Start: 2022-01-04 | End: 2022-02-28 | Stop reason: SDUPTHER

## 2022-02-28 ENCOUNTER — OFFICE VISIT (OUTPATIENT)
Dept: FAMILY MEDICINE CLINIC | Age: 87
End: 2022-02-28
Payer: MEDICARE

## 2022-02-28 VITALS
OXYGEN SATURATION: 94 % | WEIGHT: 115 LBS | DIASTOLIC BLOOD PRESSURE: 74 MMHG | HEIGHT: 64 IN | SYSTOLIC BLOOD PRESSURE: 120 MMHG | BODY MASS INDEX: 19.63 KG/M2 | TEMPERATURE: 97.3 F | HEART RATE: 76 BPM

## 2022-02-28 DIAGNOSIS — F41.1 GAD (GENERALIZED ANXIETY DISORDER): ICD-10-CM

## 2022-02-28 DIAGNOSIS — Z53.20 REFUSAL OF OSTEOPOROSIS MONITORING: ICD-10-CM

## 2022-02-28 DIAGNOSIS — I10 ESSENTIAL HYPERTENSION: Primary | ICD-10-CM

## 2022-02-28 PROCEDURE — G8752 SYS BP LESS 140: HCPCS | Performed by: FAMILY MEDICINE

## 2022-02-28 PROCEDURE — G8536 NO DOC ELDER MAL SCRN: HCPCS | Performed by: FAMILY MEDICINE

## 2022-02-28 PROCEDURE — 1090F PRES/ABSN URINE INCON ASSESS: CPT | Performed by: FAMILY MEDICINE

## 2022-02-28 PROCEDURE — G8427 DOCREV CUR MEDS BY ELIG CLIN: HCPCS | Performed by: FAMILY MEDICINE

## 2022-02-28 PROCEDURE — G8754 DIAS BP LESS 90: HCPCS | Performed by: FAMILY MEDICINE

## 2022-02-28 PROCEDURE — 99214 OFFICE O/P EST MOD 30 MIN: CPT | Performed by: FAMILY MEDICINE

## 2022-02-28 PROCEDURE — G8400 PT W/DXA NO RESULTS DOC: HCPCS | Performed by: FAMILY MEDICINE

## 2022-02-28 PROCEDURE — 1101F PT FALLS ASSESS-DOCD LE1/YR: CPT | Performed by: FAMILY MEDICINE

## 2022-02-28 PROCEDURE — G8420 CALC BMI NORM PARAMETERS: HCPCS | Performed by: FAMILY MEDICINE

## 2022-02-28 PROCEDURE — G8510 SCR DEP NEG, NO PLAN REQD: HCPCS | Performed by: FAMILY MEDICINE

## 2022-02-28 RX ORDER — METOPROLOL SUCCINATE 25 MG/1
25 TABLET, EXTENDED RELEASE ORAL DAILY
Qty: 90 TABLET | Refills: 1 | Status: SHIPPED | OUTPATIENT
Start: 2022-02-28 | End: 2022-09-13 | Stop reason: SDUPTHER

## 2022-02-28 RX ORDER — LISINOPRIL AND HYDROCHLOROTHIAZIDE 12.5; 2 MG/1; MG/1
TABLET ORAL
Qty: 90 TABLET | Refills: 1 | Status: SHIPPED | OUTPATIENT
Start: 2022-02-28 | End: 2022-09-13 | Stop reason: SDUPTHER

## 2022-02-28 RX ORDER — LORAZEPAM 0.5 MG/1
TABLET ORAL
Qty: 30 TABLET | Refills: 0 | Status: SHIPPED | OUTPATIENT
Start: 2022-02-28 | End: 2022-06-07

## 2022-02-28 NOTE — PROGRESS NOTES
Chief Complaint   Patient presents with    Follow-up     6 month f/u on hypertension      1. Have you been to the ER, urgent care clinic since your last visit? Hospitalized since your last visit? No    2. Have you seen or consulted any other health care providers outside of the [address] [place] [address] since your last visit? Include any pap smears or colon screening.  No       3 most recent PHQ Screens 2/28/2022   Little interest or pleasure in doing things Not at all   Feeling down, depressed, irritable, or hopeless Not at all   Total Score PHQ 2 0

## 2022-02-28 NOTE — PROGRESS NOTES
Subjective  Chief Complaint   Patient presents with    Follow-up     6 month f/u on hypertension      HPI:  Guillermo Valentin is a 81 y.o. female. She is on the schedule today for follow-up on hypertension and anxiety. She reports taking her blood pressure medication as directed. She only occasionally checks home blood pressure readings and states that they are generally around 130/70. She continues to use her lorazepam as needed only and is out of supply. Past Medical History:   Diagnosis Date    Essential hypertension 7/24/2020    Generalized anxiety disorder 7/24/2020    History of appendectomy     History of tonsillectomy     Hyperlipidemia 7/24/2020     Family History   Problem Relation Age of Onset    Hypertension Mother     Hypertension Father     Cancer Other     Ovarian Cancer Daughter      Social History     Socioeconomic History    Marital status:      Spouse name: Not on file    Number of children: Not on file    Years of education: Not on file    Highest education level: Not on file   Occupational History    Not on file   Tobacco Use    Smoking status: Never Smoker    Smokeless tobacco: Never Used   Vaping Use    Vaping Use: Never used   Substance and Sexual Activity    Alcohol use: Yes    Drug use: Not on file    Sexual activity: Not on file   Other Topics Concern    Not on file   Social History Narrative    Not on file     Social Determinants of Health     Financial Resource Strain:     Difficulty of Paying Living Expenses: Not on file   Food Insecurity:     Worried About Running Out of Food in the Last Year: Not on file    Pedro of Food in the Last Year: Not on file   Transportation Needs:     Lack of Transportation (Medical): Not on file    Lack of Transportation (Non-Medical):  Not on file   Physical Activity:     Days of Exercise per Week: Not on file    Minutes of Exercise per Session: Not on file   Stress:     Feeling of Stress : Not on file   Social Connections:     Frequency of Communication with Friends and Family: Not on file    Frequency of Social Gatherings with Friends and Family: Not on file    Attends [employer] Services: Not on file    Active Member of Clubs or Organizations: Not on file    Attends Club or Organization Meetings: Not on file    Marital Status: Not on file   Intimate Partner Violence:     Fear of Current or Ex-Partner: Not on file    Emotionally Abused: Not on file    Physically Abused: Not on file    Sexually Abused: Not on file   Housing Stability:     Unable to Pay for Housing in the Last Year: Not on file    Number of [place] in the Last Year: Not on file    Unstable Housing in the Last Year: Not on file     Current Outpatient Medications on File Prior to Visit   Medication Sig Dispense Refill    cyclobenzaprine (FLEXERIL) 5 mg tablet Take 1 Tab by mouth three (3) times daily as needed for Muscle Spasm(s). 60 Tab 0    [DISCONTINUED] metoprolol succinate (TOPROL-XL) 25 mg XL tablet TAKE ONE TABLET BY MOUTH DAILY 90 Tablet 1    [DISCONTINUED] lisinopril-hydroCHLOROthiazide (PRINZIDE, ZESTORETIC) 20-12.5 mg per tablet TAKE ONE TABLET BY MOUTH DAILY - APPOINTMENT NEEDED FOR ADDITIONAL REFILLS 90 Tablet 2    [DISCONTINUED] LORazepam (ATIVAN) 0.5 mg tablet TAKE ONE-HALF TO ONE TABLET BY MOUTH THREE TIMES A DAY AS NEEDED 30 Tablet 0     No current facility-administered medications on file prior to visit. No Known Allergies  Review of Systems   Constitutional: Negative for chills, fever and malaise/fatigue. Respiratory: Negative for cough, shortness of breath and wheezing. Cardiovascular: Negative for chest pain, palpitations and leg swelling. Gastrointestinal: Negative for diarrhea and vomiting. Genitourinary: Negative for dysuria. Neurological: Negative for dizziness, tingling and weakness. Psychiatric/Behavioral: Negative for depression. The patient is not nervous/anxious.         Objective  Visit Vitals  /74 (BP 1 Location: Left upper arm, BP Patient Position: At rest, BP Cuff Size: Adult)   Pulse 76   Temp 97.3 °F (36.3 °C) (Temporal)   Ht 5' 3.5\" (1.613 m)   Wt 115 lb (52.2 kg)   SpO2 94%   BMI 20.05 kg/m²     Physical Exam  Constitutional:       General: She is not in acute distress. Appearance: Normal appearance. She is normal weight. HENT:      Head: Normocephalic and atraumatic. Neck:      Thyroid: No thyroid mass or thyromegaly. Cardiovascular:      Rate and Rhythm: Normal rate and regular rhythm. Heart sounds: No murmur heard. Pulmonary:      Effort: Pulmonary effort is normal. No respiratory distress. Breath sounds: Normal breath sounds. No wheezing. Musculoskeletal:      Cervical back: Neck supple. No muscular tenderness. Right lower leg: No edema. Left lower leg: No edema. Lymphadenopathy:      Cervical: No cervical adenopathy. Skin:     General: Skin is warm and dry. Neurological:      Mental Status: She is alert and oriented to person, place, and time. Mental status is at baseline. Psychiatric:         Attention and Perception: Attention and perception normal.         Mood and Affect: Mood and affect normal.         Speech: Speech normal.         Behavior: Behavior normal.          Assessment & Plan    ICD-10-CM ICD-9-CM    1. Essential hypertension  I10 401.9 lisinopril-hydroCHLOROthiazide (PRINZIDE, ZESTORETIC) 20-12.5 mg per tablet      metoprolol succinate (TOPROL-XL) 25 mg XL tablet   2. GINGER (generalized anxiety disorder)  F41.1 300.02 LORazepam (ATIVAN) 0.5 mg tablet   3. Refusal of osteoporosis monitoring  Z53.20 V64.2      Diagnoses and all orders for this visit:    1. Essential hypertension  -     lisinopril-hydroCHLOROthiazide (PRINZIDE, ZESTORETIC) 20-12.5 mg per tablet; TAKE ONE TABLET BY MOUTH DAILY  -     metoprolol succinate (TOPROL-XL) 25 mg XL tablet; Take 1 Tablet by mouth daily. Blood pressure is excellent on intake.   We will continue current medication regimen and have her keep up healthy lifestyle. 2. GINGER (generalized anxiety disorder)  -     LORazepam (ATIVAN) 0.5 mg tablet; TAKE ONE-HALF TO ONE TABLET BY MOUTH Two TIMES A DAY AS NEEDED   reviewed. Continue to use lorazepam as needed only. 3. Refusal of osteoporosis monitoring  She declines bone density testing reporting that she exercises and takes calcium plus vitamin D. I reviewed that if she has osteoporosis that this alone will not prevent fracture. Encourage her to consider at least having the test then making a decision on care plan after that. She states that she will consider but is not ready to do the testing at this time. Follow-up and Dispositions    · Return in 6 months (on 8/28/2022) for [address], fasting follow up.        Karen Preston MD

## 2022-03-19 PROBLEM — F41.1 GENERALIZED ANXIETY DISORDER: Status: ACTIVE | Noted: 2020-07-24

## 2022-03-19 PROBLEM — E78.00 PURE HYPERCHOLESTEROLEMIA: Status: ACTIVE | Noted: 2020-07-24

## 2022-03-19 PROBLEM — I10 ESSENTIAL HYPERTENSION: Status: ACTIVE | Noted: 2020-07-24

## 2022-06-06 DIAGNOSIS — F41.1 GAD (GENERALIZED ANXIETY DISORDER): ICD-10-CM

## 2022-06-07 RX ORDER — LORAZEPAM 0.5 MG/1
TABLET ORAL
Qty: 30 TABLET | Refills: 0 | Status: SHIPPED | OUTPATIENT
Start: 2022-06-07 | End: 2022-09-13 | Stop reason: SDUPTHER

## 2022-09-12 ENCOUNTER — TELEPHONE (OUTPATIENT)
Dept: FAMILY MEDICINE CLINIC | Age: 87
End: 2022-09-12

## 2022-09-12 NOTE — TELEPHONE ENCOUNTER
----- Message from Josy Cosme sent at 9/12/2022 12:48 PM EDT -----  Subject: Message to Provider    QUESTIONS  Information for Provider?  returning call  ---------------------------------------------------------------------------  --------------  [id number]  [phone]; OK to leave message on voicemail  ---------------------------------------------------------------------------  --------------  SCRIPT ANSWERS  undefined

## 2022-09-13 ENCOUNTER — OFFICE VISIT (OUTPATIENT)
Dept: FAMILY MEDICINE CLINIC | Age: 87
End: 2022-09-13
Payer: MEDICARE

## 2022-09-13 VITALS
DIASTOLIC BLOOD PRESSURE: 78 MMHG | HEIGHT: 64 IN | WEIGHT: 112 LBS | BODY MASS INDEX: 19.12 KG/M2 | TEMPERATURE: 97.5 F | OXYGEN SATURATION: 100 % | HEART RATE: 78 BPM | SYSTOLIC BLOOD PRESSURE: 116 MMHG

## 2022-09-13 DIAGNOSIS — I10 ESSENTIAL HYPERTENSION: ICD-10-CM

## 2022-09-13 DIAGNOSIS — Z00.00 WELLNESS EXAMINATION: Primary | ICD-10-CM

## 2022-09-13 DIAGNOSIS — G89.29 CHRONIC LEFT-SIDED LOW BACK PAIN WITH LEFT-SIDED SCIATICA: ICD-10-CM

## 2022-09-13 DIAGNOSIS — Z78.0 POSTMENOPAUSAL STATE: ICD-10-CM

## 2022-09-13 DIAGNOSIS — Z13.39 ALCOHOL SCREENING: ICD-10-CM

## 2022-09-13 DIAGNOSIS — Z23 ENCOUNTER FOR IMMUNIZATION: ICD-10-CM

## 2022-09-13 DIAGNOSIS — Z13.31 DEPRESSION SCREENING: ICD-10-CM

## 2022-09-13 DIAGNOSIS — Z53.20 OSTEOPOROSIS SCREENING DECLINED: ICD-10-CM

## 2022-09-13 DIAGNOSIS — F41.1 GENERALIZED ANXIETY DISORDER: ICD-10-CM

## 2022-09-13 DIAGNOSIS — E78.00 PURE HYPERCHOLESTEROLEMIA: ICD-10-CM

## 2022-09-13 DIAGNOSIS — M54.42 CHRONIC LEFT-SIDED LOW BACK PAIN WITH LEFT-SIDED SCIATICA: ICD-10-CM

## 2022-09-13 PROCEDURE — G0008 ADMIN INFLUENZA VIRUS VAC: HCPCS | Performed by: FAMILY MEDICINE

## 2022-09-13 PROCEDURE — G8427 DOCREV CUR MEDS BY ELIG CLIN: HCPCS | Performed by: FAMILY MEDICINE

## 2022-09-13 PROCEDURE — 1090F PRES/ABSN URINE INCON ASSESS: CPT | Performed by: FAMILY MEDICINE

## 2022-09-13 PROCEDURE — G8536 NO DOC ELDER MAL SCRN: HCPCS | Performed by: FAMILY MEDICINE

## 2022-09-13 PROCEDURE — G8400 PT W/DXA NO RESULTS DOC: HCPCS | Performed by: FAMILY MEDICINE

## 2022-09-13 PROCEDURE — G8752 SYS BP LESS 140: HCPCS | Performed by: FAMILY MEDICINE

## 2022-09-13 PROCEDURE — G8510 SCR DEP NEG, NO PLAN REQD: HCPCS | Performed by: FAMILY MEDICINE

## 2022-09-13 PROCEDURE — G0439 PPPS, SUBSEQ VISIT: HCPCS | Performed by: FAMILY MEDICINE

## 2022-09-13 PROCEDURE — 1123F ACP DISCUSS/DSCN MKR DOCD: CPT | Performed by: FAMILY MEDICINE

## 2022-09-13 PROCEDURE — G8754 DIAS BP LESS 90: HCPCS | Performed by: FAMILY MEDICINE

## 2022-09-13 PROCEDURE — 90694 VACC AIIV4 NO PRSRV 0.5ML IM: CPT | Performed by: FAMILY MEDICINE

## 2022-09-13 PROCEDURE — 99214 OFFICE O/P EST MOD 30 MIN: CPT | Performed by: FAMILY MEDICINE

## 2022-09-13 PROCEDURE — G8420 CALC BMI NORM PARAMETERS: HCPCS | Performed by: FAMILY MEDICINE

## 2022-09-13 PROCEDURE — 1101F PT FALLS ASSESS-DOCD LE1/YR: CPT | Performed by: FAMILY MEDICINE

## 2022-09-13 RX ORDER — LORAZEPAM 0.5 MG/1
TABLET ORAL
Qty: 30 TABLET | Refills: 0 | Status: SHIPPED | OUTPATIENT
Start: 2022-09-13

## 2022-09-13 RX ORDER — LISINOPRIL AND HYDROCHLOROTHIAZIDE 12.5; 2 MG/1; MG/1
TABLET ORAL
Qty: 90 TABLET | Refills: 1 | Status: SHIPPED | OUTPATIENT
Start: 2022-09-13

## 2022-09-13 RX ORDER — CYCLOBENZAPRINE HCL 5 MG
5 TABLET ORAL
Qty: 60 TABLET | Refills: 0 | Status: SHIPPED | OUTPATIENT
Start: 2022-09-13

## 2022-09-13 RX ORDER — METOPROLOL SUCCINATE 25 MG/1
25 TABLET, EXTENDED RELEASE ORAL DAILY
Qty: 90 TABLET | Refills: 1 | Status: SHIPPED | OUTPATIENT
Start: 2022-09-13

## 2022-09-13 NOTE — PROGRESS NOTES
Chief Complaint   Patient presents with    Annual Wellness Visit     1. Have you been to the ER, urgent care clinic since your last visit? Hospitalized since your last visit? No    2. Have you seen or consulted any other health care providers outside of the [address] [place] [address] since your last visit? Include any pap smears or colon screening. No      3 most recent PHQ Screens 9/13/2022   Little interest or pleasure in doing things Not at all   Feeling down, depressed, irritable, or hopeless Not at all   Total Score PHQ 2 0       Fall Risk Assessment, last 12 mths 9/13/2022   Able to walk? Yes   Fall in past 12 months? 0   Do you feel unsteady? 0   Are you worried about falling 1   Is TUG test greater than 12 seconds? 0   Is the gait abnormal? 0   Number of falls in past 12 months -   Fall with injury?  -

## 2022-09-13 NOTE — PROGRESS NOTES
Medicare Wellness Exam:    Chief Complaint   Patient presents with    Annual Wellness Visit     she is a 81y.o. year old female who presents for evaluation for their Medicare Wellness Visit. She is also following up on histories of hypertension, hyperlipidemia, and anxiety. She reports taking both blood pressure tablets daily as directed. She is lifestyle controlled for cholesterol and does report starting to increase some weight resistance exercise and balance classes. She uses the lorazepam as needed. She reports that she can go 2 weeks without using it at all then may have 2 bad days where she needs it. Not checking home blood pressures readings. She is requesting a refill for both lorazepam and Flexeril. The Flexeril she uses as needed when left-sided sciatica flares. Fall Screen is completed and assessed=yes. Depression Screen is completed and assessed=yes  Medication list reviewed and adjusted for accuracy=yes  Immunizations reviewed and updated=yes  Health/Preventative Screenings reviewed and updated=yes  ADL Functions reviewed=yes  MiniCog Score= 5/5 (2 points for clock and 3/3 points for recall)   See EMR questionnaires and scanned medicare wellness documents for full details. Patient Active Problem List    Diagnosis    Chronic left-sided low back pain with left-sided sciatica    Essential hypertension    Generalized anxiety disorder    Pure hypercholesterolemia       Reviewed PmHx, RxHx, FmHx, SocHx, AllgHx and updated and dated in the chart. Objective:     Vitals:    09/13/22 0859   BP: 116/78   Pulse: 78   Temp: 97.5 °F (36.4 °C)   TempSrc: Temporal   SpO2: 100%   Weight: 112 lb (50.8 kg)   Height: 5' 3.5\" (1.613 m)     Physical Exam  Constitutional:       General: She is not in acute distress. Appearance: Normal appearance. She is normal weight. HENT:      Head: Normocephalic and atraumatic. Neck:      Thyroid: No thyroid mass or thyromegaly.    Cardiovascular:      Rate and Rhythm: Normal rate and regular rhythm. Heart sounds: No murmur heard. Pulmonary:      Effort: Pulmonary effort is normal. No respiratory distress. Breath sounds: Normal breath sounds. No wheezing. Musculoskeletal:      Cervical back: Neck supple. No muscular tenderness. Right lower leg: No edema. Left lower leg: No edema. Lymphadenopathy:      Cervical: No cervical adenopathy. Skin:     General: Skin is warm and dry. Neurological:      Mental Status: She is alert and oriented to person, place, and time. Mental status is at baseline. Psychiatric:         Attention and Perception: Attention and perception normal.         Mood and Affect: Mood and affect normal.         Speech: Speech normal.         Behavior: Behavior normal.        Assessment/ Plan:   Diagnoses and all orders for this visit:    1. Wellness examination  We are getting patient up to date on recommended preventative measures as noted. 2. Encounter for immunization  -     INFLUENZA, FLUAD, (AGE 65 Y+), IM, PF, 0.5 ML    3. Alcohol screening    4. Depression screening    5. Postmenopausal state    6. Osteoporosis screening declined  We reviewed risks of spontaneous fracture should she have osteoporosis that is currently being untreated. She reports understanding but states that she would decline treatment even if identified with osteoporosis. 7. Essential hypertension  -     lisinopril-hydroCHLOROthiazide (PRINZIDE, ZESTORETIC) 20-12.5 mg per tablet; TAKE ONE TABLET BY MOUTH DAILY  -     metoprolol succinate (TOPROL-XL) 25 mg XL tablet; Take 1 Tablet by mouth daily. Blood pressure is excellent in the office today. I am continuing current meds but do recommend checking periodic home readings with resting. 8. Pure hypercholesterolemia  -     LIPID PANEL  -     METABOLIC PANEL, COMPREHENSIVE  -     CBC WITH AUTOMATED DIFF  She is currently lifestyle controlled for this history.   We will check levels and adjust if necessary. 9. Generalized anxiety disorder  -     LORazepam (ATIVAN) 0.5 mg tablet; TAKE 1/2 TABLET TO ONE TABLET BY MOUTH TWICE A DAY AS NEEDED  She continues to use the lorazepam as needed only. Refill provided as requested. 10. Chronic left-sided low back pain with left-sided sciatica  -     cyclobenzaprine (FLEXERIL) 5 mg tablet; Take 1 Tablet by mouth three (3) times daily as needed for Muscle Spasm(s). She is using Flexeril as needed only. Refill provided as requested.    -Pain evaluation performed today  -Cognitive Screen performed today  -Depression Screen, Fall risks (by up and go test)  and ADL functionality were addressed  -Medication list updated and reviewed for any changes   -A comprehensive review of medical issues and a plan was formulated  -End of life planning was addressed with pt   -Health Screenings for preventions were addressed and a plan was formulated  -Discussed with patient cancer risk factors and appropriate screenings for age  -Labs from previous visits were discussed with patient   -Discussed with patient diet and exercise and formulated a plan as needed  -An Advanced care plan was developed with the patient.  -Alcohol screening performed    -  Follow-up and Dispositions    Return in about 6 months (around 3/13/2023) for f/u HTN, non fasting. I have discussed the diagnosis with the patient and the intended plan as seen in the above orders. The patient understands and agrees with the plan. The patient has received an after-visit summary and questions were answered concerning future plans. Medication Side Effects and Warnings were discussed with patien  Patient Labs were reviewed and or requested  Patient Past Records were reviewed and or requested    There are no Patient Instructions on file for this visit.       Michelle Kam MD

## 2022-09-14 LAB
ALBUMIN SERPL-MCNC: 4.3 G/DL (ref 3.6–4.6)
ALBUMIN/GLOB SERPL: 1.7 {RATIO} (ref 1.2–2.2)
ALP SERPL-CCNC: 53 IU/L (ref 44–121)
ALT SERPL-CCNC: 16 IU/L (ref 0–32)
AST SERPL-CCNC: 22 IU/L (ref 0–40)
BASOPHILS # BLD AUTO: 0.1 X10E3/UL (ref 0–0.2)
BASOPHILS NFR BLD AUTO: 1 %
BILIRUB SERPL-MCNC: 0.4 MG/DL (ref 0–1.2)
BUN SERPL-MCNC: 33 MG/DL (ref 8–27)
BUN/CREAT SERPL: 35 (ref 12–28)
CALCIUM SERPL-MCNC: 9.6 MG/DL (ref 8.7–10.3)
CHLORIDE SERPL-SCNC: 98 MMOL/L (ref 96–106)
CHOLEST SERPL-MCNC: 209 MG/DL (ref 100–199)
CO2 SERPL-SCNC: 24 MMOL/L (ref 20–29)
CREAT SERPL-MCNC: 0.93 MG/DL (ref 0.57–1)
EGFR: 60 ML/MIN/1.73
EOSINOPHIL # BLD AUTO: 0.1 X10E3/UL (ref 0–0.4)
EOSINOPHIL NFR BLD AUTO: 2 %
ERYTHROCYTE [DISTWIDTH] IN BLOOD BY AUTOMATED COUNT: 11.7 % (ref 11.7–15.4)
GLOBULIN SER CALC-MCNC: 2.5 G/DL (ref 1.5–4.5)
GLUCOSE SERPL-MCNC: 92 MG/DL (ref 65–99)
HCT VFR BLD AUTO: 40.3 % (ref 34–46.6)
HDLC SERPL-MCNC: 79 MG/DL
HGB BLD-MCNC: 12.9 G/DL (ref 11.1–15.9)
IMM GRANULOCYTES # BLD AUTO: 0 X10E3/UL (ref 0–0.1)
IMM GRANULOCYTES NFR BLD AUTO: 0 %
LDLC SERPL CALC-MCNC: 116 MG/DL (ref 0–99)
LYMPHOCYTES # BLD AUTO: 2 X10E3/UL (ref 0.7–3.1)
LYMPHOCYTES NFR BLD AUTO: 35 %
MCH RBC QN AUTO: 30.4 PG (ref 26.6–33)
MCHC RBC AUTO-ENTMCNC: 32 G/DL (ref 31.5–35.7)
MCV RBC AUTO: 95 FL (ref 79–97)
MONOCYTES # BLD AUTO: 0.4 X10E3/UL (ref 0.1–0.9)
MONOCYTES NFR BLD AUTO: 6 %
NEUTROPHILS # BLD AUTO: 3.2 X10E3/UL (ref 1.4–7)
NEUTROPHILS NFR BLD AUTO: 56 %
PLATELET # BLD AUTO: 212 X10E3/UL (ref 150–450)
POTASSIUM SERPL-SCNC: 4.6 MMOL/L (ref 3.5–5.2)
PROT SERPL-MCNC: 6.8 G/DL (ref 6–8.5)
RBC # BLD AUTO: 4.24 X10E6/UL (ref 3.77–5.28)
SODIUM SERPL-SCNC: 138 MMOL/L (ref 134–144)
TRIGL SERPL-MCNC: 82 MG/DL (ref 0–149)
VLDLC SERPL CALC-MCNC: 14 MG/DL (ref 5–40)
WBC # BLD AUTO: 5.8 X10E3/UL (ref 3.4–10.8)

## 2022-12-16 DIAGNOSIS — F41.1 GENERALIZED ANXIETY DISORDER: ICD-10-CM

## 2022-12-16 RX ORDER — LORAZEPAM 0.5 MG/1
TABLET ORAL
Qty: 30 TABLET | Refills: 0 | Status: SHIPPED | OUTPATIENT
Start: 2022-12-16

## 2023-03-27 ENCOUNTER — OFFICE VISIT (OUTPATIENT)
Dept: FAMILY MEDICINE CLINIC | Age: 88
End: 2023-03-27
Payer: MEDICARE

## 2023-03-27 VITALS
SYSTOLIC BLOOD PRESSURE: 110 MMHG | WEIGHT: 112.4 LBS | BODY MASS INDEX: 19.19 KG/M2 | TEMPERATURE: 97.1 F | HEIGHT: 64 IN | DIASTOLIC BLOOD PRESSURE: 80 MMHG | OXYGEN SATURATION: 98 % | HEART RATE: 61 BPM

## 2023-03-27 DIAGNOSIS — I10 ESSENTIAL HYPERTENSION: Primary | ICD-10-CM

## 2023-03-27 DIAGNOSIS — F41.1 GENERALIZED ANXIETY DISORDER: ICD-10-CM

## 2023-03-27 DIAGNOSIS — Z53.20 OSTEOPOROSIS SCREENING DECLINED: ICD-10-CM

## 2023-03-27 PROCEDURE — 1101F PT FALLS ASSESS-DOCD LE1/YR: CPT | Performed by: FAMILY MEDICINE

## 2023-03-27 PROCEDURE — G8536 NO DOC ELDER MAL SCRN: HCPCS | Performed by: FAMILY MEDICINE

## 2023-03-27 PROCEDURE — 1123F ACP DISCUSS/DSCN MKR DOCD: CPT | Performed by: FAMILY MEDICINE

## 2023-03-27 PROCEDURE — 99213 OFFICE O/P EST LOW 20 MIN: CPT | Performed by: FAMILY MEDICINE

## 2023-03-27 PROCEDURE — G8427 DOCREV CUR MEDS BY ELIG CLIN: HCPCS | Performed by: FAMILY MEDICINE

## 2023-03-27 PROCEDURE — G8510 SCR DEP NEG, NO PLAN REQD: HCPCS | Performed by: FAMILY MEDICINE

## 2023-03-27 PROCEDURE — G8420 CALC BMI NORM PARAMETERS: HCPCS | Performed by: FAMILY MEDICINE

## 2023-03-27 PROCEDURE — 1090F PRES/ABSN URINE INCON ASSESS: CPT | Performed by: FAMILY MEDICINE

## 2023-03-27 RX ORDER — LORAZEPAM 0.5 MG/1
TABLET ORAL
Qty: 30 TABLET | Refills: 0 | Status: SHIPPED | OUTPATIENT
Start: 2023-03-27

## 2023-03-27 NOTE — PROGRESS NOTES
Chief Complaint   Patient presents with    Follow Up Chronic Condition     HTN   Visit Vitals  /80 (BP 1 Location: Right arm, BP Patient Position: Sitting, BP Cuff Size: Adult)   Pulse 61   Temp 97.1 °F (36.2 °C) (Temporal)   Ht 5' 3.5\" (1.613 m)   Wt 112 lb 6.4 oz (51 kg)   SpO2 98%   BMI 19.60 kg/m²       1. \"Have you been to the ER, urgent care clinic since your last visit? Hospitalized since your last visit? \" No    2. \"Have you seen or consulted any other health care providers outside of the [address] [place] [address] since your last visit? \" No     3. For patients aged 39-70: Has the patient had a colonoscopy / FIT/ Cologuard? NA - based on age      If the patient is female:    4. For patients aged 41-77: Has the patient had a mammogram within the past 2 years? NA - based on age or sex      11. For patients aged 21-65: Has the patient had a pap smear?  NA - based on age or sex  3 most recent PHQ Screens 3/27/2023   Little interest or pleasure in doing things Not at all   Feeling down, depressed, irritable, or hopeless Not at all   Total Score PHQ 2 0

## 2023-03-27 NOTE — PROGRESS NOTES
Subjective  Chief Complaint   Patient presents with    Follow Up Chronic Condition     HTN     HPI:  Doug Rodriguez is a 81 y.o. female. She is following up today on history of hypertension. She reports taking her medication daily as directed. She is asking if we can cut the medication in half due to low readings on intake. Her systolic readings at home range anywhere from 105 up to 160 but admits that the higher levels are not resting. She reports feeling well overall. She is requesting a refill of her lorazepam.  She uses this as needed only.     Past Medical History:   Diagnosis Date    Essential hypertension 7/24/2020    Generalized anxiety disorder 7/24/2020    History of appendectomy     History of tonsillectomy     Hyperlipidemia 7/24/2020     Family History   Problem Relation Age of Onset    Hypertension Mother     Hypertension Father     Cancer Other     Ovarian Cancer Daughter      Social History     Socioeconomic History    Marital status:      Spouse name: Not on file    Number of children: Not on file    Years of education: Not on file    Highest education level: Not on file   Occupational History    Not on file   Tobacco Use    Smoking status: Never    Smokeless tobacco: Never   Vaping Use    Vaping Use: Never used   Substance and Sexual Activity    Alcohol use: Yes    Drug use: Not on file    Sexual activity: Not on file   Other Topics Concern    Not on file   Social History Narrative    Not on file     Social Determinants of Health     Financial Resource Strain: Low Risk     Difficulty of Paying Living Expenses: Not hard at all   Food Insecurity: No Food Insecurity    Worried About Running Out of Food in the Last Year: Never true    Ran Out of Food in the Last Year: Never true   Transportation Needs: Not on file   Physical Activity: Not on file   Stress: Not on file   Social Connections: Not on file   Intimate Partner Violence: Not on file   Housing Stability: Not on file     Current Outpatient Medications on File Prior to Visit   Medication Sig Dispense Refill    lisinopril-hydroCHLOROthiazide (PRINZIDE, ZESTORETIC) 20-12.5 mg per tablet TAKE ONE TABLET BY MOUTH DAILY 90 Tablet 1    metoprolol succinate (TOPROL-XL) 25 mg XL tablet Take 1 Tablet by mouth daily. 90 Tablet 1    cyclobenzaprine (FLEXERIL) 5 mg tablet Take 1 Tablet by mouth three (3) times daily as needed for Muscle Spasm(s). 60 Tablet 0    [DISCONTINUED] LORazepam (ATIVAN) 0.5 mg tablet TAKE 1/2 TO 1 TABLET BY MOUTH TWO TIMES A DAY AS NEEDED 30 Tablet 0     No current facility-administered medications on file prior to visit. No Known Allergies    Objective  Visit Vitals  /80 (BP 1 Location: Right arm, BP Patient Position: Sitting, BP Cuff Size: Adult)   Pulse 61   Temp 97.1 °F (36.2 °C) (Temporal)   Ht 5' 3.5\" (1.613 m)   Wt 112 lb 6.4 oz (51 kg)   SpO2 98%   BMI 19.60 kg/m²     Physical Exam  Constitutional:       General: She is not in acute distress. Appearance: Normal appearance. She is normal weight. HENT:      Head: Normocephalic and atraumatic. Neck:      Thyroid: No thyroid mass or thyromegaly. Cardiovascular:      Rate and Rhythm: Normal rate and regular rhythm. Heart sounds: No murmur heard. Pulmonary:      Effort: Pulmonary effort is normal. No respiratory distress. Breath sounds: Normal breath sounds. No wheezing. Musculoskeletal:      Cervical back: Neck supple. No muscular tenderness. Right lower leg: No edema. Left lower leg: No edema. Lymphadenopathy:      Cervical: No cervical adenopathy. Skin:     General: Skin is warm and dry. Neurological:      Mental Status: She is alert and oriented to person, place, and time. Mental status is at baseline.    Psychiatric:         Attention and Perception: Attention and perception normal.         Mood and Affect: Mood and affect normal.         Speech: Speech normal.         Behavior: Behavior normal.        Assessment & Plan ICD-10-CM ICD-9-CM    1. Essential hypertension  I10 401.9       2. Generalized anxiety disorder  F41.1 300.02 LORazepam (ATIVAN) 0.5 mg tablet      3. Osteoporosis screening declined  Z53.20 V64.2         Diagnoses and all orders for this visit:    1. Essential hypertension  We talked about discontinuing the hydrochlorothiazide portion of her medication but she notes that she just filled a 90-day supply and would prefer to cut the lisinopril hydrochlorothiazide tablets in half. She will do so and watch the readings. If readings increase substantially she will contact the office and we will instead do the lisinopril 20 mg and discontinue the hydrochlorothiazide. 2. Generalized anxiety disorder  -     LORazepam (ATIVAN) 0.5 mg tablet; TAKE 1/2 TO 1 TABLET BY MOUTH TWO TIMES A DAY AS NEEDED  She should continue to use as needed only. Last filled just over 3 months ago for 30 tablets. 3. Osteoporosis screening declined  Risks of untreated osteoporosis reviewed but she continues to decline screening. Aspects of this note have been generated using voice recognition software. Despite editing, there may be some syntax errors.   Follow-up and Dispositions    Return in about 6 months (around 9/27/2023) for [address], f/u- ideally fast.       Marilu So MD

## 2023-05-25 RX ORDER — CYCLOBENZAPRINE HCL 5 MG
5 TABLET ORAL 3 TIMES DAILY PRN
COMMUNITY
Start: 2022-09-13

## 2023-05-25 RX ORDER — LISINOPRIL AND HYDROCHLOROTHIAZIDE 20; 12.5 MG/1; MG/1
1 TABLET ORAL DAILY
COMMUNITY
Start: 2022-09-13 | End: 2023-05-29

## 2023-05-25 RX ORDER — LORAZEPAM 0.5 MG/1
TABLET ORAL
COMMUNITY
Start: 2023-03-27

## 2023-05-25 RX ORDER — METOPROLOL SUCCINATE 25 MG/1
25 TABLET, EXTENDED RELEASE ORAL DAILY
COMMUNITY
Start: 2022-09-13 | End: 2023-05-29

## 2023-05-29 RX ORDER — LISINOPRIL AND HYDROCHLOROTHIAZIDE 20; 12.5 MG/1; MG/1
TABLET ORAL
Qty: 90 TABLET | Refills: 1 | Status: SHIPPED | OUTPATIENT
Start: 2023-05-29

## 2023-05-29 RX ORDER — METOPROLOL SUCCINATE 25 MG/1
TABLET, EXTENDED RELEASE ORAL
Qty: 90 TABLET | Refills: 1 | Status: SHIPPED | OUTPATIENT
Start: 2023-05-29

## 2023-10-03 ENCOUNTER — OFFICE VISIT (OUTPATIENT)
Facility: CLINIC | Age: 88
End: 2023-10-03
Payer: MEDICARE

## 2023-10-03 VITALS
OXYGEN SATURATION: 98 % | SYSTOLIC BLOOD PRESSURE: 142 MMHG | WEIGHT: 112.2 LBS | DIASTOLIC BLOOD PRESSURE: 90 MMHG | RESPIRATION RATE: 20 BRPM | HEIGHT: 64 IN | HEART RATE: 64 BPM | TEMPERATURE: 97.9 F | BODY MASS INDEX: 19.15 KG/M2

## 2023-10-03 DIAGNOSIS — Z00.00 MEDICARE ANNUAL WELLNESS VISIT, SUBSEQUENT: Primary | ICD-10-CM

## 2023-10-03 DIAGNOSIS — I10 ESSENTIAL HYPERTENSION: ICD-10-CM

## 2023-10-03 DIAGNOSIS — Z23 ENCOUNTER FOR IMMUNIZATION: ICD-10-CM

## 2023-10-03 DIAGNOSIS — E78.00 PURE HYPERCHOLESTEROLEMIA: ICD-10-CM

## 2023-10-03 DIAGNOSIS — F41.1 GENERALIZED ANXIETY DISORDER: ICD-10-CM

## 2023-10-03 PROBLEM — G89.29 CHRONIC LEFT-SIDED LOW BACK PAIN WITH LEFT-SIDED SCIATICA: Status: ACTIVE | Noted: 2022-09-13

## 2023-10-03 PROBLEM — M54.42 CHRONIC LEFT-SIDED LOW BACK PAIN WITH LEFT-SIDED SCIATICA: Status: ACTIVE | Noted: 2022-09-13

## 2023-10-03 PROCEDURE — G0439 PPPS, SUBSEQ VISIT: HCPCS | Performed by: FAMILY MEDICINE

## 2023-10-03 PROCEDURE — 1090F PRES/ABSN URINE INCON ASSESS: CPT | Performed by: FAMILY MEDICINE

## 2023-10-03 PROCEDURE — G8420 CALC BMI NORM PARAMETERS: HCPCS | Performed by: FAMILY MEDICINE

## 2023-10-03 PROCEDURE — 99214 OFFICE O/P EST MOD 30 MIN: CPT | Performed by: FAMILY MEDICINE

## 2023-10-03 PROCEDURE — G8484 FLU IMMUNIZE NO ADMIN: HCPCS | Performed by: FAMILY MEDICINE

## 2023-10-03 PROCEDURE — G8427 DOCREV CUR MEDS BY ELIG CLIN: HCPCS | Performed by: FAMILY MEDICINE

## 2023-10-03 PROCEDURE — 1123F ACP DISCUSS/DSCN MKR DOCD: CPT | Performed by: FAMILY MEDICINE

## 2023-10-03 PROCEDURE — 1036F TOBACCO NON-USER: CPT | Performed by: FAMILY MEDICINE

## 2023-10-03 RX ORDER — LISINOPRIL AND HYDROCHLOROTHIAZIDE 20; 12.5 MG/1; MG/1
1 TABLET ORAL DAILY
Qty: 90 TABLET | Refills: 1 | Status: SHIPPED | OUTPATIENT
Start: 2023-10-03

## 2023-10-03 RX ORDER — METOPROLOL SUCCINATE 25 MG/1
12.5 TABLET, EXTENDED RELEASE ORAL DAILY
Qty: 45 TABLET | Refills: 1 | Status: SHIPPED | OUTPATIENT
Start: 2023-10-03

## 2023-10-03 RX ORDER — LORAZEPAM 0.5 MG/1
TABLET ORAL
Qty: 30 TABLET | Refills: 0 | Status: SHIPPED | OUTPATIENT
Start: 2023-10-03 | End: 2024-10-10

## 2023-10-03 SDOH — ECONOMIC STABILITY: HOUSING INSECURITY
IN THE LAST 12 MONTHS, WAS THERE A TIME WHEN YOU DID NOT HAVE A STEADY PLACE TO SLEEP OR SLEPT IN A SHELTER (INCLUDING NOW)?: NO

## 2023-10-03 SDOH — ECONOMIC STABILITY: FOOD INSECURITY: WITHIN THE PAST 12 MONTHS, THE FOOD YOU BOUGHT JUST DIDN'T LAST AND YOU DIDN'T HAVE MONEY TO GET MORE.: NEVER TRUE

## 2023-10-03 SDOH — ECONOMIC STABILITY: FOOD INSECURITY: WITHIN THE PAST 12 MONTHS, YOU WORRIED THAT YOUR FOOD WOULD RUN OUT BEFORE YOU GOT MONEY TO BUY MORE.: NEVER TRUE

## 2023-10-03 SDOH — ECONOMIC STABILITY: INCOME INSECURITY: HOW HARD IS IT FOR YOU TO PAY FOR THE VERY BASICS LIKE FOOD, HOUSING, MEDICAL CARE, AND HEATING?: NOT VERY HARD

## 2023-10-03 ASSESSMENT — PATIENT HEALTH QUESTIONNAIRE - PHQ9
SUM OF ALL RESPONSES TO PHQ QUESTIONS 1-9: 0
SUM OF ALL RESPONSES TO PHQ9 QUESTIONS 1 & 2: 0
SUM OF ALL RESPONSES TO PHQ QUESTIONS 1-9: 0
1. LITTLE INTEREST OR PLEASURE IN DOING THINGS: 0
SUM OF ALL RESPONSES TO PHQ QUESTIONS 1-9: 0
2. FEELING DOWN, DEPRESSED OR HOPELESS: 0
SUM OF ALL RESPONSES TO PHQ QUESTIONS 1-9: 0

## 2023-10-03 ASSESSMENT — LIFESTYLE VARIABLES
HOW OFTEN DO YOU HAVE A DRINK CONTAINING ALCOHOL: 4 OR MORE TIMES A WEEK
HOW OFTEN DURING THE LAST YEAR HAVE YOU NEEDED AN ALCOHOLIC DRINK FIRST THING IN THE MORNING TO GET YOURSELF GOING AFTER A NIGHT OF HEAVY DRINKING: 0
HOW MANY STANDARD DRINKS CONTAINING ALCOHOL DO YOU HAVE ON A TYPICAL DAY: 1 OR 2
HAS A RELATIVE, FRIEND, DOCTOR, OR ANOTHER HEALTH PROFESSIONAL EXPRESSED CONCERN ABOUT YOUR DRINKING OR SUGGESTED YOU CUT DOWN: 0
HAVE YOU OR SOMEONE ELSE BEEN INJURED AS A RESULT OF YOUR DRINKING: 0
HOW OFTEN DURING THE LAST YEAR HAVE YOU FOUND THAT YOU WERE NOT ABLE TO STOP DRINKING ONCE YOU HAD STARTED: 0
HOW OFTEN DURING THE LAST YEAR HAVE YOU BEEN UNABLE TO REMEMBER WHAT HAPPENED THE NIGHT BEFORE BECAUSE YOU HAD BEEN DRINKING: 0
HOW OFTEN DURING THE LAST YEAR HAVE YOU FAILED TO DO WHAT WAS NORMALLY EXPECTED FROM YOU BECAUSE OF DRINKING: 0
HOW OFTEN DURING THE LAST YEAR HAVE YOU HAD A FEELING OF GUILT OR REMORSE AFTER DRINKING: 0

## 2023-10-03 NOTE — PATIENT INSTRUCTIONS

## 2023-10-03 NOTE — PROGRESS NOTES
Medicare Annual Wellness Visit    Sherri Mathur is here for Medicare AWV    Assessment & Plan   Medicare annual wellness visit, subsequent  We have reviewed recommended age appropriate vaccines, cancer screenings, and other preventative measures. We are getting patient up to date as noted. Any documentation needed from other facilities will be requested. Encounter for immunization  Being greater than 61years old, I recommend the RSV vaccine along with the flu and updated COVID booster. These can be done together or split apart if prefered. Ideally, I would plan to have them completed by the end of October. Pure hypercholesterolemia  -     Lipid Panel  -     CBC with Auto Differential  -     Comprehensive Metabolic Panel  Currently lifestyle controlled for this history. We will check levels and adjust if necessary. Given age, she does not want to start any new medication for this unless critical.    Essential hypertension  -     lisinopril-hydroCHLOROthiazide (PRINZIDE;ZESTORETIC) 20-12.5 MG per tablet; Take 1 tablet by mouth daily, Disp-90 tablet, R-1Normal  -     metoprolol succinate (TOPROL XL) 25 MG extended release tablet; Take 0.5 tablets by mouth daily, Disp-45 tablet, R-1Normal  Blood pressure is now above goal taking half tabs of her medication. We will continue her at the half tab of metoprolol but increase the lisinopril hydrochlorothiazide back to whole tab daily. I will see her at her usual 6-month follow-up but she can contact me sooner if there are any concerns. Generalized anxiety disorder  -     LORazepam (ATIVAN) 0.5 MG tablet; TAKE 1/2 TO 1 TABLET BY MOUTH TWO TIMES A DAY AS NEEDED, Disp-30 tablet, R-0Normal  Last refill on any controlled substance was the 30 tabs of Ativan provided 6 months ago. Agreeable to refill to continue to use as needed only.     Recommendations for Preventive Services Due: see orders and patient instructions/AVS.  Recommended screening schedule for the

## 2023-10-04 LAB
ALBUMIN SERPL-MCNC: 4.4 G/DL (ref 3.7–4.7)
ALBUMIN/GLOB SERPL: 1.8 {RATIO} (ref 1.2–2.2)
ALP SERPL-CCNC: 49 IU/L (ref 44–121)
ALT SERPL-CCNC: 13 IU/L (ref 0–32)
AST SERPL-CCNC: 22 IU/L (ref 0–40)
BASOPHILS # BLD AUTO: 0.1 X10E3/UL (ref 0–0.2)
BASOPHILS NFR BLD AUTO: 1 %
BILIRUB SERPL-MCNC: 0.4 MG/DL (ref 0–1.2)
BUN SERPL-MCNC: 26 MG/DL (ref 8–27)
BUN/CREAT SERPL: 31 (ref 12–28)
CALCIUM SERPL-MCNC: 9.3 MG/DL (ref 8.7–10.3)
CHLORIDE SERPL-SCNC: 104 MMOL/L (ref 96–106)
CHOLEST SERPL-MCNC: 209 MG/DL (ref 100–199)
CO2 SERPL-SCNC: 26 MMOL/L (ref 20–29)
CREAT SERPL-MCNC: 0.85 MG/DL (ref 0.57–1)
EGFRCR SERPLBLD CKD-EPI 2021: 67 ML/MIN/1.73
EOSINOPHIL # BLD AUTO: 0.1 X10E3/UL (ref 0–0.4)
EOSINOPHIL NFR BLD AUTO: 1 %
ERYTHROCYTE [DISTWIDTH] IN BLOOD BY AUTOMATED COUNT: 11.8 % (ref 11.7–15.4)
GLOBULIN SER CALC-MCNC: 2.4 G/DL (ref 1.5–4.5)
GLUCOSE SERPL-MCNC: 98 MG/DL (ref 70–99)
HCT VFR BLD AUTO: 41.8 % (ref 34–46.6)
HDLC SERPL-MCNC: 85 MG/DL
HGB BLD-MCNC: 13.5 G/DL (ref 11.1–15.9)
IMM GRANULOCYTES # BLD AUTO: 0 X10E3/UL (ref 0–0.1)
IMM GRANULOCYTES NFR BLD AUTO: 0 %
LDLC SERPL CALC-MCNC: 114 MG/DL (ref 0–99)
LYMPHOCYTES # BLD AUTO: 1.8 X10E3/UL (ref 0.7–3.1)
LYMPHOCYTES NFR BLD AUTO: 31 %
MCH RBC QN AUTO: 30.5 PG (ref 26.6–33)
MCHC RBC AUTO-ENTMCNC: 32.3 G/DL (ref 31.5–35.7)
MCV RBC AUTO: 95 FL (ref 79–97)
MONOCYTES # BLD AUTO: 0.4 X10E3/UL (ref 0.1–0.9)
MONOCYTES NFR BLD AUTO: 7 %
NEUTROPHILS # BLD AUTO: 3.5 X10E3/UL (ref 1.4–7)
NEUTROPHILS NFR BLD AUTO: 60 %
PLATELET # BLD AUTO: 204 X10E3/UL (ref 150–450)
POTASSIUM SERPL-SCNC: 4.9 MMOL/L (ref 3.5–5.2)
PROT SERPL-MCNC: 6.8 G/DL (ref 6–8.5)
RBC # BLD AUTO: 4.42 X10E6/UL (ref 3.77–5.28)
SODIUM SERPL-SCNC: 143 MMOL/L (ref 134–144)
TRIGL SERPL-MCNC: 58 MG/DL (ref 0–149)
VLDLC SERPL CALC-MCNC: 10 MG/DL (ref 5–40)
WBC # BLD AUTO: 5.9 X10E3/UL (ref 3.4–10.8)

## 2023-11-24 DIAGNOSIS — I10 ESSENTIAL HYPERTENSION: ICD-10-CM

## 2023-11-24 RX ORDER — METOPROLOL SUCCINATE 25 MG/1
25 TABLET, EXTENDED RELEASE ORAL DAILY
Qty: 90 TABLET | OUTPATIENT
Start: 2023-11-24

## 2023-11-24 RX ORDER — LISINOPRIL AND HYDROCHLOROTHIAZIDE 20; 12.5 MG/1; MG/1
1 TABLET ORAL DAILY
Qty: 90 TABLET | Refills: 1 | OUTPATIENT
Start: 2023-11-24

## 2023-11-25 RX ORDER — METOPROLOL SUCCINATE 25 MG/1
25 TABLET, EXTENDED RELEASE ORAL DAILY
Qty: 90 TABLET | Refills: 1 | Status: SHIPPED | OUTPATIENT
Start: 2023-11-25

## 2023-11-25 RX ORDER — LISINOPRIL AND HYDROCHLOROTHIAZIDE 20; 12.5 MG/1; MG/1
1 TABLET ORAL DAILY
Qty: 90 TABLET | Refills: 1 | Status: SHIPPED | OUTPATIENT
Start: 2023-11-25

## 2024-04-17 ENCOUNTER — OFFICE VISIT (OUTPATIENT)
Facility: CLINIC | Age: 89
End: 2024-04-17
Payer: MEDICARE

## 2024-04-17 VITALS
HEART RATE: 84 BPM | OXYGEN SATURATION: 99 % | RESPIRATION RATE: 18 BRPM | SYSTOLIC BLOOD PRESSURE: 138 MMHG | WEIGHT: 112 LBS | HEIGHT: 64 IN | DIASTOLIC BLOOD PRESSURE: 80 MMHG | TEMPERATURE: 97.6 F | BODY MASS INDEX: 19.12 KG/M2

## 2024-04-17 DIAGNOSIS — Z23 ENCOUNTER FOR IMMUNIZATION: ICD-10-CM

## 2024-04-17 DIAGNOSIS — F41.1 GENERALIZED ANXIETY DISORDER: ICD-10-CM

## 2024-04-17 DIAGNOSIS — I10 ESSENTIAL HYPERTENSION: Primary | ICD-10-CM

## 2024-04-17 PROCEDURE — G8427 DOCREV CUR MEDS BY ELIG CLIN: HCPCS | Performed by: FAMILY MEDICINE

## 2024-04-17 PROCEDURE — 99213 OFFICE O/P EST LOW 20 MIN: CPT | Performed by: FAMILY MEDICINE

## 2024-04-17 PROCEDURE — 1123F ACP DISCUSS/DSCN MKR DOCD: CPT | Performed by: FAMILY MEDICINE

## 2024-04-17 PROCEDURE — 1036F TOBACCO NON-USER: CPT | Performed by: FAMILY MEDICINE

## 2024-04-17 PROCEDURE — 1090F PRES/ABSN URINE INCON ASSESS: CPT | Performed by: FAMILY MEDICINE

## 2024-04-17 PROCEDURE — G8420 CALC BMI NORM PARAMETERS: HCPCS | Performed by: FAMILY MEDICINE

## 2024-04-17 RX ORDER — METOPROLOL SUCCINATE 25 MG/1
25 TABLET, EXTENDED RELEASE ORAL DAILY
Qty: 90 TABLET | Refills: 1 | Status: SHIPPED | OUTPATIENT
Start: 2024-04-17

## 2024-04-17 RX ORDER — LORAZEPAM 0.5 MG/1
TABLET ORAL
Qty: 30 TABLET | Refills: 0 | Status: SHIPPED | OUTPATIENT
Start: 2024-04-17 | End: 2025-04-25

## 2024-04-17 RX ORDER — LISINOPRIL AND HYDROCHLOROTHIAZIDE 20; 12.5 MG/1; MG/1
1 TABLET ORAL DAILY
Qty: 90 TABLET | Refills: 1 | Status: SHIPPED | OUTPATIENT
Start: 2024-04-17

## 2024-04-17 ASSESSMENT — PATIENT HEALTH QUESTIONNAIRE - PHQ9
2. FEELING DOWN, DEPRESSED OR HOPELESS: NOT AT ALL
SUM OF ALL RESPONSES TO PHQ QUESTIONS 1-9: 0
SUM OF ALL RESPONSES TO PHQ9 QUESTIONS 1 & 2: 0
1. LITTLE INTEREST OR PLEASURE IN DOING THINGS: NOT AT ALL
SUM OF ALL RESPONSES TO PHQ QUESTIONS 1-9: 0

## 2024-04-17 NOTE — PROGRESS NOTES
Subjective  Chief Complaint   Patient presents with    Follow-up Chronic Condition     HPI:  Ana Cruz is a 88 y.o. female.    She is following up today on history of hypertension and anxiety.  She reports taking her blood pressure medications daily as directed.  She is continuing to use her lorazepam sparingly only.  Her last refill on this was for 30 tablets 6 months ago.  She is requesting a refill today.  She is not checking blood pressures at home but notes that it was 120/75 at dentist last week.  No acute complaints or concerns.    Past Medical History:   Diagnosis Date    Essential hypertension 7/24/2020    Generalized anxiety disorder 7/24/2020    History of appendectomy     History of tonsillectomy     Hyperlipidemia 7/24/2020     Current Outpatient Medications on File Prior to Visit   Medication Sig Dispense Refill    cyclobenzaprine (FLEXERIL) 5 MG tablet Take 1 tablet by mouth 3 times daily as needed (Patient not taking: Reported on 10/3/2023)       No current facility-administered medications on file prior to visit.     No Known Allergies    Objective  Vitals:    04/17/24 0925   BP: 138/80   Pulse: 84   Resp: 18   Temp: 97.6 °F (36.4 °C)   SpO2: 99%     Physical Exam  Constitutional:       General: She is not in acute distress.     Appearance: Normal appearance. She is normal weight. She is not ill-appearing.   HENT:      Head: Normocephalic and atraumatic.   Neck:      Thyroid: No thyroid mass or thyromegaly.   Cardiovascular:      Rate and Rhythm: Normal rate and regular rhythm.      Heart sounds: No murmur heard.  Pulmonary:      Effort: Pulmonary effort is normal. No respiratory distress.      Breath sounds: Normal breath sounds. No wheezing, rhonchi or rales.   Musculoskeletal:      Cervical back: Neck supple.      Right lower leg: No edema.      Left lower leg: No edema.   Neurological:      General: No focal deficit present.      Mental Status: She is alert and oriented to person, place,

## 2024-04-17 NOTE — PROGRESS NOTES
\"Have you been to the ER, urgent care clinic since your last visit?  Hospitalized since your last visit?\"    NO    “Have you seen or consulted any other health care providers outside of [facility] since your last visit?”    NO  Chief Complaint   Patient presents with    Follow-up Chronic Condition      /80 (Site: Right Upper Arm, Position: Sitting, Cuff Size: Medium Adult)   Temp 97.6 °F (36.4 °C) (Temporal)   Resp 18   Ht 1.613 m (5' 3.5\")   Wt 50.8 kg (112 lb)   SpO2 98%   BMI 19.53 kg/m²    PHQ-9 Total Score: 0 (2024  9:25 AM)         No questionnaires available.                                  Click Here for Release of Records Request     Identified Patient with 2 Patient Identifiers-Name and

## 2024-11-07 DIAGNOSIS — I10 ESSENTIAL HYPERTENSION: ICD-10-CM

## 2024-11-08 RX ORDER — LISINOPRIL AND HYDROCHLOROTHIAZIDE 12.5; 2 MG/1; MG/1
1 TABLET ORAL DAILY
Qty: 90 TABLET | Refills: 1 | Status: SHIPPED | OUTPATIENT
Start: 2024-11-08

## 2024-11-08 RX ORDER — METOPROLOL SUCCINATE 25 MG/1
25 TABLET, EXTENDED RELEASE ORAL DAILY
Qty: 90 TABLET | Refills: 1 | Status: SHIPPED | OUTPATIENT
Start: 2024-11-08

## 2024-11-08 NOTE — TELEPHONE ENCOUNTER
Future Appointments  11/8/2024 - 11/8/2026   Date Visit Type Department Provider    11/12/2024  9:00 AM MEDICARE ANNUAL WELL VISIT [facility] Maryjo Lyon MD   Appointment Notes:   Return in about 6 months (around 10/17/2024) for AWV, f/u. ideally fast.

## 2024-11-12 ENCOUNTER — OFFICE VISIT (OUTPATIENT)
Facility: CLINIC | Age: 89
End: 2024-11-12

## 2024-11-12 VITALS
HEIGHT: 64 IN | OXYGEN SATURATION: 98 % | WEIGHT: 112.2 LBS | SYSTOLIC BLOOD PRESSURE: 142 MMHG | HEART RATE: 89 BPM | DIASTOLIC BLOOD PRESSURE: 80 MMHG | RESPIRATION RATE: 18 BRPM | TEMPERATURE: 97.4 F | BODY MASS INDEX: 19.15 KG/M2

## 2024-11-12 DIAGNOSIS — Z00.00 MEDICARE ANNUAL WELLNESS VISIT, SUBSEQUENT: Primary | ICD-10-CM

## 2024-11-12 DIAGNOSIS — E78.00 PURE HYPERCHOLESTEROLEMIA: ICD-10-CM

## 2024-11-12 DIAGNOSIS — Z23 ENCOUNTER FOR IMMUNIZATION: ICD-10-CM

## 2024-11-12 DIAGNOSIS — F41.1 GENERALIZED ANXIETY DISORDER: ICD-10-CM

## 2024-11-12 DIAGNOSIS — I10 ESSENTIAL HYPERTENSION: ICD-10-CM

## 2024-11-12 RX ORDER — LISINOPRIL AND HYDROCHLOROTHIAZIDE 12.5; 2 MG/1; MG/1
1 TABLET ORAL DAILY
Qty: 90 TABLET | Refills: 1 | Status: SHIPPED | OUTPATIENT
Start: 2024-11-12

## 2024-11-12 RX ORDER — LORAZEPAM 0.5 MG/1
TABLET ORAL
Qty: 30 TABLET | Refills: 0 | Status: SHIPPED | OUTPATIENT
Start: 2024-11-12 | End: 2025-11-20

## 2024-11-12 RX ORDER — METOPROLOL SUCCINATE 25 MG/1
25 TABLET, EXTENDED RELEASE ORAL DAILY
Qty: 90 TABLET | Refills: 1 | Status: SHIPPED | OUTPATIENT
Start: 2024-11-12

## 2024-11-12 SDOH — ECONOMIC STABILITY: FOOD INSECURITY: WITHIN THE PAST 12 MONTHS, THE FOOD YOU BOUGHT JUST DIDN'T LAST AND YOU DIDN'T HAVE MONEY TO GET MORE.: NEVER TRUE

## 2024-11-12 SDOH — ECONOMIC STABILITY: FOOD INSECURITY: WITHIN THE PAST 12 MONTHS, YOU WORRIED THAT YOUR FOOD WOULD RUN OUT BEFORE YOU GOT MONEY TO BUY MORE.: NEVER TRUE

## 2024-11-12 SDOH — ECONOMIC STABILITY: INCOME INSECURITY: HOW HARD IS IT FOR YOU TO PAY FOR THE VERY BASICS LIKE FOOD, HOUSING, MEDICAL CARE, AND HEATING?: NOT VERY HARD

## 2024-11-12 ASSESSMENT — PATIENT HEALTH QUESTIONNAIRE - PHQ9
SUM OF ALL RESPONSES TO PHQ9 QUESTIONS 1 & 2: 0
SUM OF ALL RESPONSES TO PHQ QUESTIONS 1-9: 0
SUM OF ALL RESPONSES TO PHQ QUESTIONS 1-9: 0
1. LITTLE INTEREST OR PLEASURE IN DOING THINGS: NOT AT ALL
SUM OF ALL RESPONSES TO PHQ QUESTIONS 1-9: 0
SUM OF ALL RESPONSES TO PHQ QUESTIONS 1-9: 0
2. FEELING DOWN, DEPRESSED OR HOPELESS: NOT AT ALL

## 2024-11-12 ASSESSMENT — LIFESTYLE VARIABLES
HOW MANY STANDARD DRINKS CONTAINING ALCOHOL DO YOU HAVE ON A TYPICAL DAY: 1 OR 2
HOW OFTEN DO YOU HAVE A DRINK CONTAINING ALCOHOL: 2-3 TIMES A WEEK

## 2024-11-12 NOTE — PATIENT INSTRUCTIONS
Learning About Vision Tests  What are vision tests?     The four most common vision tests are visual acuity tests, refraction, visual field tests, and color vision tests.  Visual acuity (sharpness) tests  These tests are used:  To see if you need glasses or contact lenses.  To monitor an eye problem.  To check an eye injury.  Visual acuity tests are done as part of routine exams. You may also have this test when you get your 's license or apply for some types of jobs.  Visual field tests  These tests are used:  To check for vision loss in any area of your range of vision.  To screen for certain eye diseases.  To look for nerve damage after a stroke, head injury, or other problem that could reduce blood flow to the brain.  Refraction and color tests  A refraction test is done to find the right prescription for glasses and contact lenses.  A color vision test is done to check for color blindness.  Color vision is often tested as part of a routine exam. You may also have this test when you apply for a job where recognizing different colors is important, such as , electronics, or the .  How are vision tests done?  Visual acuity test   You cover one eye at a time.  You read aloud from a wall chart across the room.  You read aloud from a small card that you hold in your hand.  Refraction   You look into a special device.  The device puts lenses of different strengths in front of each eye to see how strong your glasses or contact lenses need to be.  Visual field tests   Your doctor may have you look through special machines.  Or your doctor may simply have you stare straight ahead while they move a finger into and out of your field of vision.  Color vision test   You look at pieces of printed test patterns in various colors. You say what number or symbol you see.  Your doctor may have you trace the number or symbol using a pointer.  How do these tests feel?  There is very little chance of  Pharmacy requested refills that are already active on file. Refused request to pharmacy.

## 2024-11-12 NOTE — PROGRESS NOTES
\"Have you been to the ER, urgent care clinic since your last visit?  Hospitalized since your last visit?\"    NO    “Have you seen or consulted any other health care providers outside our system since your last visit?”    NO  BP (!) 142/80 (Site: Right Upper Arm, Position: Sitting, Cuff Size: Medium Adult)   Pulse 89   Temp 97.4 °F (36.3 °C) (Temporal)   Resp 18   Ht 1.613 m (5' 3.5\")   Wt 50.9 kg (112 lb 3.2 oz)   SpO2 98%   BMI 19.56 kg/m²   Chief Complaint   Patient presents with    Medicare AW     PHQ-9 Total Score: 0 (2024  8:47 AM)        No questionnaires available.                             Click Here for Release of Records Request       Identified Patient with 2 Patient Identifiers-Name and   
 Right lower leg: No edema.      Left lower leg: No edema.   Neurological:      General: No focal deficit present.      Mental Status: She is alert and oriented to person, place, and time. Mental status is at baseline.      Gait: Gait normal.   Psychiatric:         Mood and Affect: Mood normal.         Behavior: Behavior normal.                No Known Allergies  Prior to Visit Medications    Medication Sig Taking? Authorizing Provider   metoprolol succinate (TOPROL XL) 25 MG extended release tablet TAKE 1 TABLET BY MOUTH DAILY Yes Maryjo Lyon MD   lisinopril-hydroCHLOROthiazide (PRINZIDE;ZESTORETIC) 20-12.5 MG per tablet TAKE 1 TABLET BY MOUTH DAILY Yes Maryjo Lyon MD   LORazepam (ATIVAN) 0.5 MG tablet TAKE 1/2 TO 1 TABLET BY MOUTH TWO TIMES A DAY AS NEEDED Yes Maryjo Lyon MD   cyclobenzaprine (FLEXERIL) 5 MG tablet Take 1 tablet by mouth 3 times daily as needed  Patient not taking: Reported on 10/3/2023  Automatic Reconciliation, Ar       CareTeam (Including outside providers/suppliers regularly involved in providing care):   Patient Care Team:  Maryjo Lyon MD as PCP - General  Maryjo Lyon MD as PCP - Empaneled Provider      Reviewed and updated this visit:  Tobacco  Allergies  Meds  Problems  Med Hx  Surg Hx  Soc Hx  Fam Hx

## 2024-11-12 NOTE — ASSESSMENT & PLAN NOTE
Currently at 1/2 tab of the metoprolol but will increase to whole if needed based on home reading.     Orders:    lisinopril-hydroCHLOROthiazide (PRINZIDE;ZESTORETIC) 20-12.5 MG per tablet; Take 1 tablet by mouth daily    metoprolol succinate (TOPROL XL) 25 MG extended release tablet; Take 1 tablet by mouth daily

## 2024-11-12 NOTE — ASSESSMENT & PLAN NOTE
reviewed.  She is feeling 30 tabs of lorazepam approximately every 6 months.  No other controlled medications.  Will continue with as needed usage.  She notes that she only uses 1/2 tablet at a time.  Orders:    LORazepam (ATIVAN) 0.5 MG tablet; TAKE 1/2 TO 1 TABLET BY MOUTH TWO TIMES A DAY AS NEEDED

## 2024-11-13 LAB
ALBUMIN SERPL-MCNC: 4 G/DL (ref 3.7–4.7)
ALP SERPL-CCNC: 51 IU/L (ref 44–121)
ALT SERPL-CCNC: 14 IU/L (ref 0–32)
AST SERPL-CCNC: 19 IU/L (ref 0–40)
BASOPHILS # BLD AUTO: 0.1 X10E3/UL (ref 0–0.2)
BASOPHILS NFR BLD AUTO: 1 %
BILIRUB SERPL-MCNC: 0.4 MG/DL (ref 0–1.2)
BUN SERPL-MCNC: 27 MG/DL (ref 8–27)
BUN/CREAT SERPL: 31 (ref 12–28)
CALCIUM SERPL-MCNC: 9.3 MG/DL (ref 8.7–10.3)
CHLORIDE SERPL-SCNC: 102 MMOL/L (ref 96–106)
CHOLEST SERPL-MCNC: 225 MG/DL (ref 100–199)
CO2 SERPL-SCNC: 23 MMOL/L (ref 20–29)
CREAT SERPL-MCNC: 0.88 MG/DL (ref 0.57–1)
EGFRCR SERPLBLD CKD-EPI 2021: 63 ML/MIN/1.73
EOSINOPHIL # BLD AUTO: 0.1 X10E3/UL (ref 0–0.4)
EOSINOPHIL NFR BLD AUTO: 1 %
ERYTHROCYTE [DISTWIDTH] IN BLOOD BY AUTOMATED COUNT: 11.9 % (ref 11.7–15.4)
GLOBULIN SER CALC-MCNC: 2.7 G/DL (ref 1.5–4.5)
GLUCOSE SERPL-MCNC: 95 MG/DL (ref 70–99)
HCT VFR BLD AUTO: 40.1 % (ref 34–46.6)
HDLC SERPL-MCNC: 81 MG/DL
HGB BLD-MCNC: 12.9 G/DL (ref 11.1–15.9)
IMM GRANULOCYTES # BLD AUTO: 0 X10E3/UL (ref 0–0.1)
IMM GRANULOCYTES NFR BLD AUTO: 0 %
LDLC SERPL CALC-MCNC: 130 MG/DL (ref 0–99)
LYMPHOCYTES # BLD AUTO: 1.7 X10E3/UL (ref 0.7–3.1)
LYMPHOCYTES NFR BLD AUTO: 27 %
MCH RBC QN AUTO: 30.4 PG (ref 26.6–33)
MCHC RBC AUTO-ENTMCNC: 32.2 G/DL (ref 31.5–35.7)
MCV RBC AUTO: 94 FL (ref 79–97)
MONOCYTES # BLD AUTO: 0.5 X10E3/UL (ref 0.1–0.9)
MONOCYTES NFR BLD AUTO: 8 %
NEUTROPHILS # BLD AUTO: 3.9 X10E3/UL (ref 1.4–7)
NEUTROPHILS NFR BLD AUTO: 63 %
PLATELET # BLD AUTO: 220 X10E3/UL (ref 150–450)
POTASSIUM SERPL-SCNC: 4.4 MMOL/L (ref 3.5–5.2)
PROT SERPL-MCNC: 6.7 G/DL (ref 6–8.5)
RBC # BLD AUTO: 4.25 X10E6/UL (ref 3.77–5.28)
SODIUM SERPL-SCNC: 139 MMOL/L (ref 134–144)
TRIGL SERPL-MCNC: 78 MG/DL (ref 0–149)
VLDLC SERPL CALC-MCNC: 14 MG/DL (ref 5–40)
WBC # BLD AUTO: 6.2 X10E3/UL (ref 3.4–10.8)

## 2024-11-22 ENCOUNTER — TELEPHONE (OUTPATIENT)
Facility: CLINIC | Age: 89
End: 2024-11-22

## 2025-05-19 ENCOUNTER — OFFICE VISIT (OUTPATIENT)
Facility: CLINIC | Age: 89
End: 2025-05-19
Payer: MEDICARE

## 2025-05-19 VITALS
RESPIRATION RATE: 20 BRPM | SYSTOLIC BLOOD PRESSURE: 138 MMHG | DIASTOLIC BLOOD PRESSURE: 88 MMHG | OXYGEN SATURATION: 98 % | HEART RATE: 94 BPM | HEIGHT: 64 IN | TEMPERATURE: 97.4 F | WEIGHT: 112 LBS | BODY MASS INDEX: 19.12 KG/M2

## 2025-05-19 DIAGNOSIS — F41.1 GENERALIZED ANXIETY DISORDER: ICD-10-CM

## 2025-05-19 DIAGNOSIS — J01.90 ACUTE BACTERIAL SINUSITIS: Primary | ICD-10-CM

## 2025-05-19 DIAGNOSIS — B96.89 ACUTE BACTERIAL SINUSITIS: Primary | ICD-10-CM

## 2025-05-19 PROCEDURE — 1123F ACP DISCUSS/DSCN MKR DOCD: CPT | Performed by: FAMILY MEDICINE

## 2025-05-19 PROCEDURE — 1159F MED LIST DOCD IN RCRD: CPT | Performed by: FAMILY MEDICINE

## 2025-05-19 PROCEDURE — 1090F PRES/ABSN URINE INCON ASSESS: CPT | Performed by: FAMILY MEDICINE

## 2025-05-19 PROCEDURE — 1126F AMNT PAIN NOTED NONE PRSNT: CPT | Performed by: FAMILY MEDICINE

## 2025-05-19 PROCEDURE — G8420 CALC BMI NORM PARAMETERS: HCPCS | Performed by: FAMILY MEDICINE

## 2025-05-19 PROCEDURE — 1036F TOBACCO NON-USER: CPT | Performed by: FAMILY MEDICINE

## 2025-05-19 PROCEDURE — G8427 DOCREV CUR MEDS BY ELIG CLIN: HCPCS | Performed by: FAMILY MEDICINE

## 2025-05-19 PROCEDURE — 99214 OFFICE O/P EST MOD 30 MIN: CPT | Performed by: FAMILY MEDICINE

## 2025-05-19 RX ORDER — LORAZEPAM 0.5 MG/1
TABLET ORAL
Qty: 30 TABLET | Refills: 0 | Status: SHIPPED | OUTPATIENT
Start: 2025-05-19 | End: 2026-05-27

## 2025-05-19 RX ORDER — SERTRALINE HYDROCHLORIDE 25 MG/1
25 TABLET, FILM COATED ORAL DAILY
Qty: 30 TABLET | Refills: 1 | Status: SHIPPED | OUTPATIENT
Start: 2025-05-19

## 2025-05-19 SDOH — ECONOMIC STABILITY: FOOD INSECURITY: WITHIN THE PAST 12 MONTHS, YOU WORRIED THAT YOUR FOOD WOULD RUN OUT BEFORE YOU GOT MONEY TO BUY MORE.: PATIENT DECLINED

## 2025-05-19 SDOH — ECONOMIC STABILITY: FOOD INSECURITY: WITHIN THE PAST 12 MONTHS, THE FOOD YOU BOUGHT JUST DIDN'T LAST AND YOU DIDN'T HAVE MONEY TO GET MORE.: PATIENT DECLINED

## 2025-05-19 ASSESSMENT — PATIENT HEALTH QUESTIONNAIRE - PHQ9
SUM OF ALL RESPONSES TO PHQ QUESTIONS 1-9: 0
2. FEELING DOWN, DEPRESSED OR HOPELESS: NOT AT ALL
SUM OF ALL RESPONSES TO PHQ QUESTIONS 1-9: 0
1. LITTLE INTEREST OR PLEASURE IN DOING THINGS: NOT AT ALL
SUM OF ALL RESPONSES TO PHQ QUESTIONS 1-9: 0
SUM OF ALL RESPONSES TO PHQ QUESTIONS 1-9: 0

## 2025-05-19 NOTE — PROGRESS NOTES
Chief Complaint   Patient presents with    Cold Symptoms     [facility] 05/07/2025 and  05/14/2025 URI-abx. Patient has been on 2 different medication to treat. Amoxil and Cefuroxime 250mg twice a day. Ears plugged and some coughing noted. Congestion.          Subjective   History of Present Illness  The patient is an 89-year-old female presenting with sinusitis and anxiety.    Sinusitis  - Symptoms of sinus infection began early May.  She saw urgent care on 05/07/2025 and was treated with amoxicillin.  - Minimal improvement so she returned on 5/14/2025 to urgent care and was given cefuroxime.  - Currently completing cefuroxime, 3 days remaining.  - She reports less pain and pressure in her maxillary sinus areas and overall general improvement.  - Persistent morning throat soreness and ear popping.  - Reports mental and physical exhaustion.  - Managed symptoms with Vicks inhalation, steaming, and saltwater gargling.    Anxiety  - Significant anxiety exacerbated by illness and personal stressors (daughter's cancer treatment, son's issues).  - Requests additional medication but fears feeling \"zoned out.\"  - Depleted lorazepam supply.    Supplemental information: Melanoma removed 2 months ago, caused considerable anxiety.       Results      Prior to Admission medications    Medication Sig Start Date End Date Taking? Authorizing Provider   sertraline (ZOLOFT) 25 MG tablet Take 1 tablet by mouth daily 5/19/25  Yes Maryjo Lyon MD   LORazepam (ATIVAN) 0.5 MG tablet TAKE 1/2 TO 1 TABLET BY MOUTH TWO TIMES A DAY AS NEEDED 5/19/25 5/27/26 Yes Maryjo Lyon MD   lisinopril-hydroCHLOROthiazide (PRINZIDE;ZESTORETIC) 20-12.5 MG per tablet Take 1 tablet by mouth daily 11/12/24  Yes Maryjo Lyon MD   metoprolol succinate (TOPROL XL) 25 MG extended release tablet Take 1 tablet by mouth daily 11/12/24  Yes Maryjo Lyon MD   cyclobenzaprine (FLEXERIL) 5 MG tablet Take 1 tablet by mouth 3

## 2025-05-19 NOTE — PROGRESS NOTES
The patient, Ana Cruz, identity was verified by name and MRN.  Chief Complaint   Patient presents with    Cold Symptoms     [facility] 05/07/2025 and  05/14/2025 URI-abx. Patient has been on 2 different medication to treat. Amoxil and Cefuroxime 250mg twice a day. Ears plugged and some coughing noted. Congestion.      No LMP recorded.  /88 (BP Site: Right Upper Arm, Patient Position: Sitting, BP Cuff Size: Medium Adult)   Pulse 94   Temp 97.4 °F (36.3 °C) (Temporal)   Resp 20   Ht 1.613 m (5' 3.5\")   Wt 50.8 kg (112 lb)   SpO2 98%   BMI 19.53 kg/m²       11/12/2024     8:47 AM   Amb Fall Risk Assessment and TUG Test   Do you feel unsteady or are you worried about falling?  no   2 or more falls in past year? no   Fall with injury in past year? no     PHQ-9 Total Score: 0 (5/19/2025  1:17 PM)    \"Have you been to the ER, urgent care clinic since your last visit?  Hospitalized since your last visit?\"    NO    “Have you seen or consulted any other health care providers outside our system since your last visit?”    NO             Social History     Substance and Sexual Activity   Sexual Activity Not Currently     Medication list reviewed and active medications noted. Patient is taking medications as directed.  See documentation in medication activity.  Allergies: allergy list reviewed, no new allergies added        No questionnaires available.                           Jennie Ritter LPN

## 2025-05-19 NOTE — ASSESSMENT & PLAN NOTE
Orders:    sertraline (ZOLOFT) 25 MG tablet; Take 1 tablet by mouth daily    LORazepam (ATIVAN) 0.5 MG tablet; TAKE 1/2 TO 1 TABLET BY MOUTH TWO TIMES A DAY AS NEEDED

## 2025-06-23 ENCOUNTER — OFFICE VISIT (OUTPATIENT)
Facility: CLINIC | Age: 89
End: 2025-06-23
Payer: MEDICARE

## 2025-06-23 VITALS
WEIGHT: 107.4 LBS | BODY MASS INDEX: 18.34 KG/M2 | SYSTOLIC BLOOD PRESSURE: 116 MMHG | DIASTOLIC BLOOD PRESSURE: 70 MMHG | HEIGHT: 64 IN | HEART RATE: 75 BPM | OXYGEN SATURATION: 95 % | TEMPERATURE: 97.5 F

## 2025-06-23 DIAGNOSIS — F41.1 GENERALIZED ANXIETY DISORDER: ICD-10-CM

## 2025-06-23 DIAGNOSIS — I10 ESSENTIAL HYPERTENSION: Primary | ICD-10-CM

## 2025-06-23 DIAGNOSIS — H92.02 LEFT EAR PAIN: ICD-10-CM

## 2025-06-23 PROCEDURE — 1036F TOBACCO NON-USER: CPT | Performed by: FAMILY MEDICINE

## 2025-06-23 PROCEDURE — 99214 OFFICE O/P EST MOD 30 MIN: CPT | Performed by: FAMILY MEDICINE

## 2025-06-23 PROCEDURE — 1126F AMNT PAIN NOTED NONE PRSNT: CPT | Performed by: FAMILY MEDICINE

## 2025-06-23 PROCEDURE — G8427 DOCREV CUR MEDS BY ELIG CLIN: HCPCS | Performed by: FAMILY MEDICINE

## 2025-06-23 PROCEDURE — 1090F PRES/ABSN URINE INCON ASSESS: CPT | Performed by: FAMILY MEDICINE

## 2025-06-23 PROCEDURE — G8420 CALC BMI NORM PARAMETERS: HCPCS | Performed by: FAMILY MEDICINE

## 2025-06-23 PROCEDURE — 1159F MED LIST DOCD IN RCRD: CPT | Performed by: FAMILY MEDICINE

## 2025-06-23 PROCEDURE — 1123F ACP DISCUSS/DSCN MKR DOCD: CPT | Performed by: FAMILY MEDICINE

## 2025-06-23 RX ORDER — LORAZEPAM 0.5 MG/1
TABLET ORAL
Qty: 30 TABLET | Refills: 0 | Status: SHIPPED | OUTPATIENT
Start: 2025-06-23 | End: 2026-07-01

## 2025-06-23 RX ORDER — METOPROLOL SUCCINATE 25 MG/1
25 TABLET, EXTENDED RELEASE ORAL DAILY
Qty: 90 TABLET | Refills: 1 | Status: SHIPPED | OUTPATIENT
Start: 2025-06-23

## 2025-06-23 RX ORDER — LISINOPRIL AND HYDROCHLOROTHIAZIDE 12.5; 2 MG/1; MG/1
1 TABLET ORAL DAILY
Qty: 90 TABLET | Refills: 1 | Status: SHIPPED | OUTPATIENT
Start: 2025-06-23

## 2025-06-23 NOTE — PROGRESS NOTES
The patient, Ana Cruz, identity was verified by name and MRN.  Chief Complaint   Patient presents with    6 Month Follow-Up     No LMP recorded.  /70 (BP Site: Left Upper Arm, Patient Position: Sitting, BP Cuff Size: Medium Adult)   Pulse 75   Temp 97.5 °F (36.4 °C) (Temporal)   Ht 1.613 m (5' 3.5\")   Wt 48.7 kg (107 lb 6.4 oz)   SpO2 95%   BMI 18.73 kg/m²       11/12/2024     8:47 AM   Amb Fall Risk Assessment and TUG Test   Do you feel unsteady or are you worried about falling?  no   2 or more falls in past year? no   Fall with injury in past year? no     No data recorded  \"Have you been to the ER, urgent care clinic since your last visit?  Hospitalized since your last visit?\"    NO    “Have you seen or consulted any other health care providers outside our system since your last visit?”    NO             Social History     Substance and Sexual Activity   Sexual Activity Not Currently     Medication list reviewed and active medications noted. Patient is taking medications as directed.  See documentation in medication activity.  Allergies: allergy list reviewed, no new allergies added    CARETEAM:      Chaperone: offered, declined  Sydnie Hu CMA

## 2025-06-23 NOTE — PROGRESS NOTES
Chief Complaint   Patient presents with    6 Month Follow-Up         Subjective   History of Present Illness  The patient is an 89-year-old female presenting for follow-up on hypertension, anxiety, and sinus infection.    Hypertension  - Home-monitored blood pressure is stable  - No dizziness or lightheadedness  - Increased fatigue since her acute sinus illness  - Medications: lisinopril, hydrochlorothiazide, and metoprolol    Anxiety  - Significant improvement with sertraline 25 mg  - Reduced stressors  - Continues to feel jittery and nervous  - Briefly discontinued sertraline due to stomach discomfort, possibly from amoxicillin  - Yogurt helped alleviate the stomach discomfort  - Requests a refill of lorazepam, used approximately 3 times in the past week, with 8 tablets remaining from last refill    Sinus Infection  - Left ear pain and head congestion since her sinus infection  - Managed with ibuprofen and AYR saline solution    Supplemental information: None       Results      Prior to Admission medications    Medication Sig Start Date End Date Taking? Authorizing Provider   sertraline (ZOLOFT) 50 MG tablet Take 1 tablet by mouth daily 6/23/25  Yes Maryjo Lyon MD   LORazepam (ATIVAN) 0.5 MG tablet TAKE 1/2 TO 1 TABLET BY MOUTH TWO TIMES A DAY AS NEEDED 6/23/25 7/1/26 Yes Maryjo Lyon MD   lisinopril-hydroCHLOROthiazide (PRINZIDE;ZESTORETIC) 20-12.5 MG per tablet Take 1 tablet by mouth daily 6/23/25  Yes Maryjo Lyon MD   metoprolol succinate (TOPROL XL) 25 MG extended release tablet Take 1 tablet by mouth daily 6/23/25  Yes Maryjo Lyon MD   cyclobenzaprine (FLEXERIL) 5 MG tablet Take 1 tablet by mouth 3 times daily as needed 9/13/22  Yes Automatic Reconciliation, Ar       Objective   Vitals:    06/23/25 0820   BP: 116/70   Pulse: 75   Temp: 97.5 °F (36.4 °C)   SpO2: 95%     Physical Exam  Constitutional:       General: She is not in acute distress.     Appearance: Normal